# Patient Record
Sex: MALE | Race: WHITE | URBAN - METROPOLITAN AREA
[De-identification: names, ages, dates, MRNs, and addresses within clinical notes are randomized per-mention and may not be internally consistent; named-entity substitution may affect disease eponyms.]

---

## 2017-01-03 ENCOUNTER — OFFICE VISIT (OUTPATIENT)
Dept: DERMATOLOGY CLINIC | Facility: CLINIC | Age: 72
End: 2017-01-03

## 2017-01-03 DIAGNOSIS — L81.4 SOLAR LENTIGO: ICD-10-CM

## 2017-01-03 DIAGNOSIS — D23.4 BENIGN NEOPLASM OF SCALP AND SKIN OF NECK: ICD-10-CM

## 2017-01-03 DIAGNOSIS — L82.1 SEBORRHEIC KERATOSES: ICD-10-CM

## 2017-01-03 DIAGNOSIS — L57.0 ACTINIC KERATOSIS: Primary | ICD-10-CM

## 2017-01-03 DIAGNOSIS — D23.60 BENIGN NEOPLASM OF SKIN OF UPPER LIMB, INCLUDING SHOULDER, UNSPECIFIED LATERALITY: ICD-10-CM

## 2017-01-03 DIAGNOSIS — D23.5 BENIGN NEOPLASM OF SKIN OF TRUNK, EXCEPT SCROTUM: ICD-10-CM

## 2017-01-03 DIAGNOSIS — D23.30 BENIGN NEOPLASM OF SKIN OF FACE: ICD-10-CM

## 2017-01-03 PROCEDURE — 17000 DESTRUCT PREMALG LESION: CPT | Performed by: DERMATOLOGY

## 2017-01-03 PROCEDURE — 17003 DESTRUCT PREMALG LES 2-14: CPT | Performed by: DERMATOLOGY

## 2017-01-03 PROCEDURE — 99213 OFFICE O/P EST LOW 20 MIN: CPT | Performed by: DERMATOLOGY

## 2017-01-03 RX ORDER — ATORVASTATIN CALCIUM 20 MG/1
TABLET, FILM COATED ORAL
COMMUNITY
Start: 2016-10-10

## 2017-01-03 NOTE — PROGRESS NOTES
HPI:     Chief Complaint     Lesion        HPI     Lesion    Additional comments: Last visit to derm was 5 months prior. Pt presents with concern of multiple lesions on skin from waist to scalp and requests an upper body skin evalution.   Pt does have of file   Not on file  Other Topics Concern    Pt has a pacemaker No    Pt has a defibrillator No    Reaction to local anesthetic No     Social History Narrative     Family History   Problem Relation Age of Onset   • Cancer Father    • Heart Disorder Mother Placed This Encounter  DESTRUCTION PREMALIGNANT LESIONS, FIRST LES  DESTRUCTION PREMALIGNANT LESIONS, 2ND - 13    Results From Past 48 Hours:  No results found for this or any previous visit (from the past 50 hour(s)).     Meds This Visit:      Genevieve Soria

## 2017-01-03 NOTE — PROGRESS NOTES
Past Medical History   Diagnosis Date   • Actinic keratosis    • Hyperlipemia    • Hypertelorism    • Unspecified essential hypertension          Past Surgical History    KNEE ARTHROSCOPY         Social History   Marital Status:   Spouse Name: N/A

## 2017-07-06 ENCOUNTER — OFFICE VISIT (OUTPATIENT)
Dept: DERMATOLOGY CLINIC | Facility: CLINIC | Age: 72
End: 2017-07-06

## 2017-07-06 DIAGNOSIS — L57.8 SUN-DAMAGED SKIN: ICD-10-CM

## 2017-07-06 DIAGNOSIS — L81.4 SOLAR LENTIGO: ICD-10-CM

## 2017-07-06 DIAGNOSIS — L57.0 ACTINIC KERATOSIS: Primary | ICD-10-CM

## 2017-07-06 PROCEDURE — 17000 DESTRUCT PREMALG LESION: CPT | Performed by: DERMATOLOGY

## 2017-07-06 PROCEDURE — 17003 DESTRUCT PREMALG LES 2-14: CPT | Performed by: DERMATOLOGY

## 2017-07-06 PROCEDURE — 99212 OFFICE O/P EST SF 10 MIN: CPT | Performed by: DERMATOLOGY

## 2017-07-06 NOTE — PROGRESS NOTES
HPI:     Chief Complaint     Derm Problem        HPI     Derm Problem    Additional comments: established pt. presents for 7 months follow up for AKs - cryotherapy done at last visit - pt wishes to re-eval forehead. \"cryotherapy helps a lot\".         Last History   Problem Relation Age of Onset   • Cancer Father    • Heart Disorder Mother    • Hypertension Mother    • Stroke Mother    • Other [OTHER] Mother    • Melanoma Other      malignant-unk relative    • Heart Disease Brother      brother has hx of CAB

## 2018-01-04 ENCOUNTER — OFFICE VISIT (OUTPATIENT)
Dept: DERMATOLOGY CLINIC | Facility: CLINIC | Age: 73
End: 2018-01-04

## 2018-01-04 DIAGNOSIS — L57.8 SUN-DAMAGED SKIN: ICD-10-CM

## 2018-01-04 DIAGNOSIS — L57.0 ACTINIC KERATOSIS: Primary | ICD-10-CM

## 2018-01-04 PROCEDURE — G0463 HOSPITAL OUTPT CLINIC VISIT: HCPCS | Performed by: DERMATOLOGY

## 2018-01-04 PROCEDURE — 99213 OFFICE O/P EST LOW 20 MIN: CPT | Performed by: DERMATOLOGY

## 2018-01-04 RX ORDER — SODIUM FLUORIDE 1.1 G/100G
GEL, DENTIFRICE ORAL
COMMUNITY
Start: 2017-11-30

## 2018-01-04 RX ORDER — FLUOROURACIL 50 MG/G
1 CREAM TOPICAL EVERY EVENING
Qty: 40 G | Refills: 2 | Status: SHIPPED | OUTPATIENT
Start: 2018-01-04 | End: 2018-02-01

## 2018-01-04 NOTE — PROGRESS NOTES
HPI:     Chief Complaint     Upper Body Exam        HPI     Upper Body Exam    Additional comments: LOV 7/6/2017. Pt presenting for 6 month upper body skin exam - Pt states forehead to be checked only.  Pt has a personal hx of AKs        Last edited by [employer] anesthetic No     Social History Narrative   None on file     Family History   Problem Relation Age of Onset   • Cancer Father    • Heart Disorder Mother    • Hypertension Mother    • Stroke Mother    • Other [OTHER] Mother    • Melanoma Other      maligna

## 2018-03-22 ENCOUNTER — OFFICE VISIT (OUTPATIENT)
Dept: DERMATOLOGY CLINIC | Facility: CLINIC | Age: 73
End: 2018-03-22

## 2018-03-22 DIAGNOSIS — L57.0 ACTINIC KERATOSIS: Primary | ICD-10-CM

## 2018-03-22 DIAGNOSIS — L57.8 SUN-DAMAGED SKIN: ICD-10-CM

## 2018-03-22 DIAGNOSIS — L81.4 SOLAR LENTIGO: ICD-10-CM

## 2018-03-22 PROCEDURE — 17000 DESTRUCT PREMALG LESION: CPT | Performed by: DERMATOLOGY

## 2018-03-22 PROCEDURE — 99212 OFFICE O/P EST SF 10 MIN: CPT | Performed by: DERMATOLOGY

## 2018-03-22 RX ORDER — GLIPIZIDE 2.5 MG/1
TABLET, EXTENDED RELEASE ORAL
COMMUNITY
Start: 2018-02-27

## 2018-03-22 NOTE — PROGRESS NOTES
HPI:     Chief Complaint     Actinic Keratosis        HPI     Actinic Keratosis    Additional comments: LOV: 01/04/18. Pt presents with AK f/u, pt using Efudex with imrpovement.         Last edited by Rafat Auguste, [id number] on 3/22/2018 11:03 AM. (History) pacemaker No    Pt has a defibrillator No    Reaction to local anesthetic No     Social History Narrative   None on file     Family History   Problem Relation Age of Onset   • Cancer Father    • Heart Disorder Mother    • Hypertension Mother    • Stroke Mo

## 2018-03-22 NOTE — PROGRESS NOTES
Past Medical History:   Diagnosis Date   • Actinic keratosis    • Hyperlipemia    • Hypertelorism    • Unspecified essential hypertension      Past Surgical History:  No date: KNEE ARTHROSCOPY    Social History  Social History   Marital status:   Sp

## 2018-09-27 ENCOUNTER — OFFICE VISIT (OUTPATIENT)
Dept: DERMATOLOGY CLINIC | Facility: CLINIC | Age: 73
End: 2018-09-27
Payer: MEDICARE

## 2018-09-27 DIAGNOSIS — L81.4 SOLAR LENTIGO: ICD-10-CM

## 2018-09-27 DIAGNOSIS — D23.60 BENIGN NEOPLASM OF SKIN OF UPPER LIMB, INCLUDING SHOULDER, UNSPECIFIED LATERALITY: ICD-10-CM

## 2018-09-27 DIAGNOSIS — L57.8 SUN-DAMAGED SKIN: ICD-10-CM

## 2018-09-27 DIAGNOSIS — D23.30 BENIGN NEOPLASM OF SKIN OF FACE: ICD-10-CM

## 2018-09-27 DIAGNOSIS — D23.4 BENIGN NEOPLASM OF SCALP AND SKIN OF NECK: ICD-10-CM

## 2018-09-27 DIAGNOSIS — D23.5 BENIGN NEOPLASM OF SKIN OF TRUNK, EXCEPT SCROTUM: ICD-10-CM

## 2018-09-27 DIAGNOSIS — L82.1 SEBORRHEIC KERATOSES: ICD-10-CM

## 2018-09-27 DIAGNOSIS — L57.0 ACTINIC KERATOSIS: Primary | ICD-10-CM

## 2018-09-27 PROCEDURE — 99213 OFFICE O/P EST LOW 20 MIN: CPT | Performed by: DERMATOLOGY

## 2018-09-27 PROCEDURE — 17003 DESTRUCT PREMALG LES 2-14: CPT | Performed by: DERMATOLOGY

## 2018-09-27 PROCEDURE — 17000 DESTRUCT PREMALG LESION: CPT | Performed by: DERMATOLOGY

## 2018-09-27 NOTE — PROGRESS NOTES
Past Medical History:   Diagnosis Date   • Actinic keratosis    • Hyperlipemia    • Hypertelorism    • Unspecified essential hypertension      Past Surgical History:  No date: KNEE ARTHROSCOPY  Social History    Socioeconomic History      Marital status: M

## 2018-09-27 NOTE — PROGRESS NOTES
HPI:     Chief Complaint     Lesion        HPI     Lesion      Additional comments: LOV: 03/22/18. Pt presents with lesions of concern from scalp to waist, pt requests an upper body exam. Pt has personal hx of AK.            Last edited by Jovani Morales CM on file    Social Needs      Financial resource strain: Not on file      Food insecurity - worry: Not on file      Food insecurity - inability: Not on file      Transportation needs - medical: Not on file      Transportation needs - non-medical: Not on netta diagnosis) lesions as noted on scalp and face are treated with cryotherapy. Patient will follow-up in 6 months.   If the left cheek lesion does not resolve may need a- biopsy to rule out less likely [facility]  Sun-damaged skin-proper use and application of broad

## 2019-04-23 ENCOUNTER — OFFICE VISIT (OUTPATIENT)
Dept: DERMATOLOGY CLINIC | Facility: CLINIC | Age: 74
End: 2019-04-23
Payer: MEDICARE

## 2019-04-23 DIAGNOSIS — D23.60 BENIGN NEOPLASM OF SKIN OF UPPER LIMB, INCLUDING SHOULDER, UNSPECIFIED LATERALITY: ICD-10-CM

## 2019-04-23 DIAGNOSIS — L81.4 SOLAR LENTIGO: ICD-10-CM

## 2019-04-23 DIAGNOSIS — D23.30 BENIGN NEOPLASM OF SKIN OF FACE: ICD-10-CM

## 2019-04-23 DIAGNOSIS — L57.0 ACTINIC KERATOSIS: Primary | ICD-10-CM

## 2019-04-23 DIAGNOSIS — D23.4 BENIGN NEOPLASM OF SCALP AND SKIN OF NECK: ICD-10-CM

## 2019-04-23 DIAGNOSIS — L57.8 SUN-DAMAGED SKIN: ICD-10-CM

## 2019-04-23 DIAGNOSIS — D23.5 BENIGN NEOPLASM OF SKIN OF TRUNK, EXCEPT SCROTUM: ICD-10-CM

## 2019-04-23 DIAGNOSIS — L82.1 SEBORRHEIC KERATOSES: ICD-10-CM

## 2019-04-23 PROCEDURE — 17003 DESTRUCT PREMALG LES 2-14: CPT | Performed by: DERMATOLOGY

## 2019-04-23 PROCEDURE — 17000 DESTRUCT PREMALG LESION: CPT | Performed by: DERMATOLOGY

## 2019-04-23 PROCEDURE — 99213 OFFICE O/P EST LOW 20 MIN: CPT | Performed by: DERMATOLOGY

## 2019-04-23 NOTE — PROGRESS NOTES
HPI:     Chief Complaint     Actinic Keratosis        HPI     Actinic Keratosis      Additional comments: LOV9/27/18. pt presenting today with f/u on actinic keratosis. no concerns. Denies family and personal HX of skin cancer.           Last edited by Fabricio Anguiano Socioeconomic History      Marital status:       Spouse name: Not on file      Number of children: Not on file      Years of education: Not on file      Highest education level: Not on file    Occupational History      Not on file    Social Needs performed, including face, neck, chest, back, abdomen, r upper extremity, l upper extremity. Also exam of scalp    The patient is alert, oriented and appears their stated age. The patient is in no distress and is well nourished.     Exam is remarkable for t

## 2019-10-29 ENCOUNTER — OFFICE VISIT (OUTPATIENT)
Dept: DERMATOLOGY CLINIC | Facility: CLINIC | Age: 74
End: 2019-10-29
Payer: MEDICARE

## 2019-10-29 DIAGNOSIS — L81.4 SOLAR LENTIGO: ICD-10-CM

## 2019-10-29 DIAGNOSIS — L82.1 SEBORRHEIC KERATOSES: ICD-10-CM

## 2019-10-29 DIAGNOSIS — L57.0 ACTINIC KERATOSIS: Primary | ICD-10-CM

## 2019-10-29 DIAGNOSIS — D23.4 BENIGN NEOPLASM OF SCALP AND SKIN OF NECK: ICD-10-CM

## 2019-10-29 DIAGNOSIS — D23.60 BENIGN NEOPLASM OF SKIN OF UPPER LIMB, INCLUDING SHOULDER, UNSPECIFIED LATERALITY: ICD-10-CM

## 2019-10-29 DIAGNOSIS — D23.5 BENIGN NEOPLASM OF SKIN OF TRUNK, EXCEPT SCROTUM: ICD-10-CM

## 2019-10-29 DIAGNOSIS — D23.30 BENIGN NEOPLASM OF SKIN OF FACE: ICD-10-CM

## 2019-10-29 PROCEDURE — 17003 DESTRUCT PREMALG LES 2-14: CPT | Performed by: DERMATOLOGY

## 2019-10-29 PROCEDURE — 17000 DESTRUCT PREMALG LESION: CPT | Performed by: DERMATOLOGY

## 2019-10-29 PROCEDURE — 99213 OFFICE O/P EST LOW 20 MIN: CPT | Performed by: DERMATOLOGY

## 2019-10-29 PROCEDURE — G0463 HOSPITAL OUTPT CLINIC VISIT: HCPCS | Performed by: DERMATOLOGY

## 2019-10-29 NOTE — PROGRESS NOTES
HPI:     Chief Complaint     Upper Body Exam        HPI     Upper Body Exam      Additional comments: LOV 4/23/2019 Patient present for upper body skin exam . Patient has hx of AK          Last edited by Carloz Dunn on 10/29/2019  7:54 AM. (Histor Not on file      Number of children: Not on file      Years of education: Not on file      Highest education level: Not on file    Occupational History      Not on file    Social Needs      Financial resource strain: Not on file      Food insecurity: extremity, l upper extremity. Also exam of scalp  The patient is alert, oriented and appears their stated age. The patient is in no distress and is well nourished.     Exam is remarkable for the following:    - melanocytic nevi are uniform in color, shape defined types were placed in this encounter.         10/29/2019  Tamea Courser

## 2020-06-16 ENCOUNTER — OFFICE VISIT (OUTPATIENT)
Dept: DERMATOLOGY CLINIC | Facility: CLINIC | Age: 75
End: 2020-06-16
Payer: MEDICARE

## 2020-06-16 DIAGNOSIS — D23.60 BENIGN NEOPLASM OF SKIN OF UPPER LIMB, INCLUDING SHOULDER, UNSPECIFIED LATERALITY: ICD-10-CM

## 2020-06-16 DIAGNOSIS — D23.5 BENIGN NEOPLASM OF SKIN OF TRUNK, EXCEPT SCROTUM: ICD-10-CM

## 2020-06-16 DIAGNOSIS — D23.4 BENIGN NEOPLASM OF SCALP AND SKIN OF NECK: ICD-10-CM

## 2020-06-16 DIAGNOSIS — D23.30 BENIGN NEOPLASM OF SKIN OF FACE: ICD-10-CM

## 2020-06-16 DIAGNOSIS — L57.8 SUN-DAMAGED SKIN: ICD-10-CM

## 2020-06-16 DIAGNOSIS — L81.4 SOLAR LENTIGO: ICD-10-CM

## 2020-06-16 DIAGNOSIS — L82.1 SEBORRHEIC KERATOSES: ICD-10-CM

## 2020-06-16 DIAGNOSIS — L57.0 ACTINIC KERATOSIS: Primary | ICD-10-CM

## 2020-06-16 PROCEDURE — 17000 DESTRUCT PREMALG LESION: CPT | Performed by: DERMATOLOGY

## 2020-06-16 PROCEDURE — G0463 HOSPITAL OUTPT CLINIC VISIT: HCPCS | Performed by: DERMATOLOGY

## 2020-06-16 PROCEDURE — 17003 DESTRUCT PREMALG LES 2-14: CPT | Performed by: DERMATOLOGY

## 2020-06-16 PROCEDURE — 99213 OFFICE O/P EST LOW 20 MIN: CPT | Performed by: DERMATOLOGY

## 2020-06-16 RX ORDER — LEVOFLOXACIN 750 MG/1
TABLET ORAL
COMMUNITY
Start: 2019-11-19 | End: 2021-12-27

## 2020-06-16 RX ORDER — TAMSULOSIN HYDROCHLORIDE 0.4 MG/1
0.4 CAPSULE ORAL DAILY
COMMUNITY
Start: 2020-02-12

## 2020-06-16 RX ORDER — BLOOD SUGAR DIAGNOSTIC
STRIP MISCELLANEOUS
COMMUNITY
Start: 2020-06-09

## 2020-12-14 ENCOUNTER — OFFICE VISIT (OUTPATIENT)
Dept: DERMATOLOGY CLINIC | Facility: CLINIC | Age: 75
End: 2020-12-14
Payer: MEDICARE

## 2020-12-14 DIAGNOSIS — D23.5 BENIGN NEOPLASM OF SKIN OF TRUNK, EXCEPT SCROTUM: ICD-10-CM

## 2020-12-14 DIAGNOSIS — L57.0 ACTINIC KERATOSIS: Primary | ICD-10-CM

## 2020-12-14 DIAGNOSIS — L57.8 SUN-DAMAGED SKIN: ICD-10-CM

## 2020-12-14 DIAGNOSIS — D23.60 BENIGN NEOPLASM OF SKIN OF UPPER LIMB, INCLUDING SHOULDER, UNSPECIFIED LATERALITY: ICD-10-CM

## 2020-12-14 DIAGNOSIS — L81.4 SOLAR LENTIGO: ICD-10-CM

## 2020-12-14 DIAGNOSIS — L82.1 SEBORRHEIC KERATOSES: ICD-10-CM

## 2020-12-14 DIAGNOSIS — D23.30 BENIGN NEOPLASM OF SKIN OF FACE: ICD-10-CM

## 2020-12-14 DIAGNOSIS — D23.4 BENIGN NEOPLASM OF SCALP AND SKIN OF NECK: ICD-10-CM

## 2020-12-14 PROCEDURE — 17000 DESTRUCT PREMALG LESION: CPT | Performed by: DERMATOLOGY

## 2020-12-14 PROCEDURE — G0463 HOSPITAL OUTPT CLINIC VISIT: HCPCS | Performed by: DERMATOLOGY

## 2020-12-14 PROCEDURE — 17003 DESTRUCT PREMALG LES 2-14: CPT | Performed by: DERMATOLOGY

## 2020-12-14 PROCEDURE — 99213 OFFICE O/P EST LOW 20 MIN: CPT | Performed by: DERMATOLOGY

## 2020-12-14 NOTE — PROGRESS NOTES
HPI:     Chief Complaint     Derm Problem        HPI     Derm Problem      Additional comments: LOV 6/16/2020 - Pt presenting for 6 month follow up to reassess AK's on upper body. Pt has personal hx of AK's but no hx of skin cancer.           Last edited • Hypertelorism    • Unspecified essential hypertension      Past Surgical History:   Procedure Laterality Date   • KNEE ARTHROSCOPY       Social History    Socioeconomic History      Marital status:       Spouse name: Not on file      Number of c • Melanoma Other         malignant-unk relative    • Heart Disease Brother         brother has hx of CABG       PHYSICAL EXAM:   An upper body exam was performed, including face, neck, chest, back, abdomen, r upper extremity, l upper extremity, and scalp Past 48 Hours:  No results found for this or any previous visit (from the past 48 hour(s)). Meds This Visit:      Imaging Orders:  None     Referral Orders:  No orders of the defined types were placed in this encounter.         12/14/2020  Erickson Kaplan

## 2021-06-21 ENCOUNTER — OFFICE VISIT (OUTPATIENT)
Dept: DERMATOLOGY CLINIC | Facility: CLINIC | Age: 76
End: 2021-06-21
Payer: COMMERCIAL

## 2021-06-21 DIAGNOSIS — L57.0 ACTINIC KERATOSIS: Primary | ICD-10-CM

## 2021-06-21 DIAGNOSIS — D23.5 BENIGN NEOPLASM OF SKIN OF TRUNK, EXCEPT SCROTUM: ICD-10-CM

## 2021-06-21 DIAGNOSIS — L81.4 SOLAR LENTIGO: ICD-10-CM

## 2021-06-21 DIAGNOSIS — L82.1 SEBORRHEIC KERATOSES: ICD-10-CM

## 2021-06-21 DIAGNOSIS — D23.30 BENIGN NEOPLASM OF SKIN OF FACE: ICD-10-CM

## 2021-06-21 DIAGNOSIS — D23.4 BENIGN NEOPLASM OF SCALP AND SKIN OF NECK: ICD-10-CM

## 2021-06-21 DIAGNOSIS — L57.8 SUN-DAMAGED SKIN: ICD-10-CM

## 2021-06-21 DIAGNOSIS — D23.60 BENIGN NEOPLASM OF SKIN OF UPPER LIMB, INCLUDING SHOULDER, UNSPECIFIED LATERALITY: ICD-10-CM

## 2021-06-21 PROCEDURE — 17003 DESTRUCT PREMALG LES 2-14: CPT | Performed by: DERMATOLOGY

## 2021-06-21 PROCEDURE — 99213 OFFICE O/P EST LOW 20 MIN: CPT | Performed by: DERMATOLOGY

## 2021-06-21 PROCEDURE — 17000 DESTRUCT PREMALG LESION: CPT | Performed by: DERMATOLOGY

## 2021-06-21 RX ORDER — LISINOPRIL AND HYDROCHLOROTHIAZIDE 20; 12.5 MG/1; MG/1
1 TABLET ORAL
COMMUNITY
Start: 2021-03-10

## 2021-06-21 NOTE — PROGRESS NOTES
HPI:     Chief Complaint     Upper Body Exam        HPI     Upper Body Exam      Additional comments: last office visit: 12/14/20 . Patient present for follow up of AK.   History of AK          Last edited by Andreea Carbajal RN on 6/21/2021  8:32 AM. (Histor UNKNOWN    Past Medical History:   Diagnosis Date   • Actinic keratosis    • Hyperlipemia    • Hypertelorism    • Unspecified essential hypertension      Past Surgical History:   Procedure Laterality Date   • KNEE ARTHROSCOPY       Social History    Socioe Father    • Heart Disorder Mother    • Hypertension Mother    • Stroke Mother    • Other (Other) Mother    • Melanoma Other         malignant-unk relative    • Heart Disease Brother         brother has hx of CABG       PHYSICAL EXAM:   An upper body exam w upper limb, including shoulder, unspecified laterality    Orders Placed This Encounter      DESTRUCTION PREMALIGNANT LESIONS, FIRST LES      DESTRUCTION PREMALIGNANT LESIONS, 2ND - 14      Results From Past 48 Hours:  No results found for this or any previ

## 2021-10-04 ENCOUNTER — PATIENT MESSAGE (OUTPATIENT)
Dept: FAMILY MEDICINE CLINIC | Facility: CLINIC | Age: 76
End: 2021-10-04

## 2021-10-05 ENCOUNTER — OFFICE VISIT (OUTPATIENT)
Dept: OTOLARYNGOLOGY | Facility: CLINIC | Age: 76
End: 2021-10-05
Payer: COMMERCIAL

## 2021-10-05 VITALS — HEIGHT: 66.5 IN | WEIGHT: 157 LBS | BODY MASS INDEX: 24.93 KG/M2

## 2021-10-05 DIAGNOSIS — R42 DYSEQUILIBRIUM: ICD-10-CM

## 2021-10-05 DIAGNOSIS — Z87.898 HISTORY OF VERTIGO: Primary | ICD-10-CM

## 2021-10-05 PROCEDURE — 3008F BODY MASS INDEX DOCD: CPT | Performed by: SPECIALIST

## 2021-10-05 PROCEDURE — 99204 OFFICE O/P NEW MOD 45 MIN: CPT | Performed by: SPECIALIST

## 2021-10-05 NOTE — PROGRESS NOTES
Diogo Lynne is a 67year old male. Patient presents with:  Dizziness: pt presents today for vertigo. pt was in the er at [facility] on 09/26 due to  lightheadness. HPI:   With 3 bouts of lightheadedness and nausea as well as disequilibrium. denies headaches    EXAM:   Ht 5' 6.5\" (1.689 m)   Wt 157 lb (71.2 kg)   BMI 24.96 kg/m²   System Details   Skin Inspection - Normal.   Constitutional Overall appearance - Normal.   Head/Face Facial features - Normal. Eyebrows - Normal. Skull - Normal. AM

## 2021-10-05 NOTE — PATIENT INSTRUCTIONS
You have had multiple bouts of disequilibrium. Your hearing seems grossly intact. I do think you will need both an audiogram and very likely an electronystagmogram to better assess the function of your inner ear.   It is possible, that you also may need a

## 2021-11-09 ENCOUNTER — OFFICE VISIT (OUTPATIENT)
Dept: AUDIOLOGY | Facility: CLINIC | Age: 76
End: 2021-11-09
Payer: COMMERCIAL

## 2021-11-09 DIAGNOSIS — R42 DIZZINESS: Primary | ICD-10-CM

## 2021-11-09 PROCEDURE — 92567 TYMPANOMETRY: CPT | Performed by: AUDIOLOGIST

## 2021-11-09 PROCEDURE — 92557 COMPREHENSIVE HEARING TEST: CPT | Performed by: AUDIOLOGIST

## 2021-12-27 ENCOUNTER — OFFICE VISIT (OUTPATIENT)
Dept: DERMATOLOGY CLINIC | Facility: CLINIC | Age: 76
End: 2021-12-27
Payer: COMMERCIAL

## 2021-12-27 DIAGNOSIS — L57.8 SUN-DAMAGED SKIN: ICD-10-CM

## 2021-12-27 DIAGNOSIS — L57.0 ACTINIC KERATOSIS: Primary | ICD-10-CM

## 2021-12-27 DIAGNOSIS — L81.4 SOLAR LENTIGO: ICD-10-CM

## 2021-12-27 DIAGNOSIS — L82.1 SEBORRHEIC KERATOSES: ICD-10-CM

## 2021-12-27 DIAGNOSIS — D22.9 MULTIPLE MELANOCYTIC NEVI: ICD-10-CM

## 2021-12-27 PROCEDURE — 17003 DESTRUCT PREMALG LES 2-14: CPT | Performed by: DERMATOLOGY

## 2021-12-27 PROCEDURE — 17000 DESTRUCT PREMALG LESION: CPT | Performed by: DERMATOLOGY

## 2021-12-27 PROCEDURE — 99213 OFFICE O/P EST LOW 20 MIN: CPT | Performed by: DERMATOLOGY

## 2021-12-27 NOTE — PROGRESS NOTES
HPI:     Chief Complaint     Derm Problem        HPI     Derm Problem      Additional comments: LOV 6/21/21 - Pt presenting for assessment of lesions on upper body in light of pt's personal hx of AKs.           Last edited by Riky Pickett RN on 12/27/2 Hypertelorism    • Unspecified essential hypertension      Past Surgical History:   Procedure Laterality Date   • KNEE ARTHROSCOPY       Social History    Socioeconomic History      Marital status:       Spouse name: Not on file      Number of child posterior crown of the scalp, 1 at the mid frontal hairline, one at the lateral left forehead, 1 by the right frontal hairline, and 1 on the left helical rim  - there are some cherry red papule  - there are numerous brown stuck on keratotic lesions.

## 2022-06-24 ENCOUNTER — OFFICE VISIT (OUTPATIENT)
Dept: DERMATOLOGY CLINIC | Facility: CLINIC | Age: 77
End: 2022-06-24
Payer: COMMERCIAL

## 2022-06-24 DIAGNOSIS — D23.60 BENIGN NEOPLASM OF SKIN OF UPPER LIMB, INCLUDING SHOULDER, UNSPECIFIED LATERALITY: ICD-10-CM

## 2022-06-24 DIAGNOSIS — D22.9 MULTIPLE MELANOCYTIC NEVI: ICD-10-CM

## 2022-06-24 DIAGNOSIS — L57.0 ACTINIC KERATOSIS: Primary | ICD-10-CM

## 2022-06-24 DIAGNOSIS — L82.1 SEBORRHEIC KERATOSES: ICD-10-CM

## 2022-06-24 DIAGNOSIS — D23.4 BENIGN NEOPLASM OF SCALP AND SKIN OF NECK: ICD-10-CM

## 2022-06-24 DIAGNOSIS — L81.4 SOLAR LENTIGO: ICD-10-CM

## 2022-06-24 DIAGNOSIS — D23.30 BENIGN NEOPLASM OF SKIN OF FACE: ICD-10-CM

## 2022-06-24 DIAGNOSIS — D23.5 BENIGN NEOPLASM OF SKIN OF TRUNK, EXCEPT SCROTUM: ICD-10-CM

## 2022-06-24 RX ORDER — COVID-19 MOLECULAR TEST ASSAY
KIT MISCELLANEOUS
COMMUNITY
Start: 2022-04-08

## 2022-08-31 ENCOUNTER — OFFICE VISIT (OUTPATIENT)
Dept: DERMATOLOGY CLINIC | Facility: CLINIC | Age: 77
End: 2022-08-31
Payer: COMMERCIAL

## 2022-08-31 DIAGNOSIS — D23.9 BENIGN NEOPLASM OF SKIN, UNSPECIFIED LOCATION: ICD-10-CM

## 2022-08-31 DIAGNOSIS — L57.8 SUN-DAMAGED SKIN: ICD-10-CM

## 2022-08-31 DIAGNOSIS — L81.4 SOLAR LENTIGO: ICD-10-CM

## 2022-08-31 DIAGNOSIS — D48.5 NEOPLASM OF UNCERTAIN BEHAVIOR OF SKIN: Primary | ICD-10-CM

## 2022-08-31 DIAGNOSIS — L82.1 SEBORRHEIC KERATOSES: ICD-10-CM

## 2022-08-31 DIAGNOSIS — D22.9 MULTIPLE MELANOCYTIC NEVI: ICD-10-CM

## 2022-08-31 DIAGNOSIS — L57.0 ACTINIC KERATOSIS: ICD-10-CM

## 2022-08-31 PROCEDURE — 88305 TISSUE EXAM BY PATHOLOGIST: CPT | Performed by: DERMATOLOGY

## 2022-09-09 NOTE — PROGRESS NOTES
Operative Report                     Shave/  Tangential biopsy     Clinical diagnosis:    Size of lesion:    Location:pt with tender lesions persistent post cryo  Spec 1 Description >>>>>: right vertex  Spec 1 Comment: r/o SCC keratotic papule 6mm  Spec 2 Description >>>>>: right helical rim  Spec 2 Comment: 1cm keratotic papule r/o SCC tender lesion    Procedure: With patient in appropriate position the skin of the above was scrubbed with alcohol. Anesthesia was obtained with 1% Xylocaine with epinephrine. The skin surrounding the lesion was placed under tension and the lesion was incised using a #15 scalpel blade. The specimen was sent for histopathologic exam.    Hemostasis was obtained with electrocautery/aluminum chloride. Estimated blood loss less than 2 cc. Biopsy dressed with Polysporin, bandage. Pressure dressing:   No    Complications: None    Written instructions given and reviewed with patient    Await pathology    Contact information reviewed.     Procedural physician:  Nelida Michelle MD

## 2022-09-09 NOTE — PROGRESS NOTES
Logged in path book and pmh. Pt informed of pathology results and KMT's recommendations for treatment. Pt verbalized understanding and provided with contact information for Dr. Nora Short. Pt's pathology results routed to Dr. Nora Short.

## 2022-09-09 NOTE — PROGRESS NOTES
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 With this location would suggest excision /reconstruction with Dr. Gianfranco Hargrove . please log in test results. Please call patient and inform of results and recommendations.  (please add to history). Pt to  rtc as scheduled or prn.

## 2022-09-16 ENCOUNTER — TELEPHONE (OUTPATIENT)
Dept: OTOLARYNGOLOGY | Facility: CLINIC | Age: 77
End: 2022-09-16

## 2022-09-16 ENCOUNTER — OFFICE VISIT (OUTPATIENT)
Dept: OTOLARYNGOLOGY | Facility: CLINIC | Age: 77
End: 2022-09-16
Payer: COMMERCIAL

## 2022-09-16 VITALS — WEIGHT: 159 LBS | HEIGHT: 66.5 IN | BODY MASS INDEX: 25.25 KG/M2

## 2022-09-16 DIAGNOSIS — C44.202 CANCER OF SKIN OF AURICLE OF RIGHT EAR: Primary | ICD-10-CM

## 2022-09-16 PROCEDURE — 1126F AMNT PAIN NOTED NONE PRSNT: CPT | Performed by: OTOLARYNGOLOGY

## 2022-09-16 PROCEDURE — 3008F BODY MASS INDEX DOCD: CPT | Performed by: OTOLARYNGOLOGY

## 2022-09-16 PROCEDURE — 99213 OFFICE O/P EST LOW 20 MIN: CPT | Performed by: OTOLARYNGOLOGY

## 2022-09-20 ENCOUNTER — TELEPHONE (OUTPATIENT)
Dept: OTOLARYNGOLOGY | Facility: CLINIC | Age: 77
End: 2022-09-20

## 2022-09-20 NOTE — TELEPHONE ENCOUNTER
Patient states he was told he was having a procedure tomorrow 09/21 and when he called to register they have no record of it.  Please advise

## 2022-09-21 ENCOUNTER — LAB REQUISITION (OUTPATIENT)
Dept: LAB | Facility: HOSPITAL | Age: 77
End: 2022-09-21

## 2022-09-21 DIAGNOSIS — C44.221 SQUAMOUS CELL CARCINOMA OF SKIN OF UNSPECIFIED EAR AND EXTERNAL AURICULAR CANAL: ICD-10-CM

## 2022-09-21 PROCEDURE — 88331 PATH CONSLTJ SURG 1 BLK 1SPC: CPT | Performed by: OTOLARYNGOLOGY

## 2022-09-21 PROCEDURE — 88305 TISSUE EXAM BY PATHOLOGIST: CPT | Performed by: OTOLARYNGOLOGY

## 2022-09-21 RX ORDER — HYDROCODONE BITARTRATE AND ACETAMINOPHEN 5; 325 MG/1; MG/1
1 TABLET ORAL EVERY 8 HOURS PRN
Qty: 10 TABLET | Refills: 0 | Status: SHIPPED | OUTPATIENT
Start: 2022-09-21

## 2022-09-21 RX ORDER — SULFAMETHOXAZOLE AND TRIMETHOPRIM 800; 160 MG/1; MG/1
1 TABLET ORAL EVERY 12 HOURS
Qty: 14 TABLET | Refills: 0 | Status: SHIPPED | OUTPATIENT
Start: 2022-09-21

## 2022-09-22 ENCOUNTER — TELEPHONE (OUTPATIENT)
Dept: OTOLARYNGOLOGY | Facility: CLINIC | Age: 77
End: 2022-09-22

## 2022-09-22 RX ORDER — CLINDAMYCIN HYDROCHLORIDE 150 MG/1
300 CAPSULE ORAL 3 TIMES DAILY
Qty: 42 CAPSULE | Refills: 0 | Status: SHIPPED | OUTPATIENT
Start: 2022-09-22 | End: 2022-09-29

## 2022-09-22 NOTE — TELEPHONE ENCOUNTER
Spoke with patient and spouse. He is doing well. Minimal pain 2/10. Using tylenol. Started ABX. Will use abx ointment as recommended by Dr. Donna Jarrett. No fever or other signs of infection. They will call us if this changes. Patient spouse mentioned that an interaction came up that   sulfamethoxazole-trimethoprim -160 MG Oral Tab per tablet    And his     Lisinopril-hydroCHLOROthiazide 20-12.5 MG Oral Tab    Title Angiotensin-Converting Enzyme Inhibitors / Trimethoprim    Risk Rating C: Monitor therapy    Summary Trimethoprim may enhance the hyperkalemic effect of Angiotensin-Converting Enzyme Inhibitors. Severity Moderate Reliability Rating Good    Dr. Donna Jarrett informed. Would like switched to clindamycin 300mg TID. Ordered. patient informed.

## 2022-09-29 ENCOUNTER — OFFICE VISIT (OUTPATIENT)
Dept: OTOLARYNGOLOGY | Facility: CLINIC | Age: 77
End: 2022-09-29

## 2022-09-29 DIAGNOSIS — C44.202 CANCER OF SKIN OF AURICLE OF RIGHT EAR: Primary | ICD-10-CM

## 2022-09-29 PROCEDURE — 99024 POSTOP FOLLOW-UP VISIT: CPT | Performed by: OTOLARYNGOLOGY

## 2022-11-08 ENCOUNTER — LAB ENCOUNTER (OUTPATIENT)
Dept: LAB | Facility: HOSPITAL | Age: 77
End: 2022-11-08
Attending: INTERNAL MEDICINE
Payer: MEDICARE

## 2022-11-08 DIAGNOSIS — I10 HTN (HYPERTENSION): Primary | ICD-10-CM

## 2022-11-08 DIAGNOSIS — R97.20 PSA ELEVATION: ICD-10-CM

## 2022-11-08 DIAGNOSIS — E11.9 DIABETES MELLITUS (HCC): ICD-10-CM

## 2022-11-08 LAB
ALBUMIN SERPL-MCNC: 3.3 G/DL (ref 3.4–5)
ALBUMIN/GLOB SERPL: 0.8 {RATIO} (ref 1–2)
ALP LIVER SERPL-CCNC: 95 U/L
ALT SERPL-CCNC: 25 U/L
ANION GAP SERPL CALC-SCNC: 6 MMOL/L (ref 0–18)
AST SERPL-CCNC: 16 U/L (ref 15–37)
BASOPHILS # BLD AUTO: 0.04 X10(3) UL (ref 0–0.2)
BASOPHILS NFR BLD AUTO: 0.6 %
BILIRUB SERPL-MCNC: 0.7 MG/DL (ref 0.1–2)
BUN BLD-MCNC: 21 MG/DL (ref 7–18)
BUN/CREAT SERPL: 18.1 (ref 10–20)
CALCIUM BLD-MCNC: 8.9 MG/DL (ref 8.5–10.1)
CHLORIDE SERPL-SCNC: 103 MMOL/L (ref 98–112)
CHOLEST SERPL-MCNC: 144 MG/DL (ref ?–200)
CO2 SERPL-SCNC: 27 MMOL/L (ref 21–32)
COMPLEXED PSA SERPL-MCNC: 5.7 NG/ML (ref ?–4)
CREAT BLD-MCNC: 1.16 MG/DL
DEPRECATED RDW RBC AUTO: 46.3 FL (ref 35.1–46.3)
EOSINOPHIL # BLD AUTO: 0.26 X10(3) UL (ref 0–0.7)
EOSINOPHIL NFR BLD AUTO: 4.1 %
ERYTHROCYTE [DISTWIDTH] IN BLOOD BY AUTOMATED COUNT: 12.9 % (ref 11–15)
EST. AVERAGE GLUCOSE BLD GHB EST-MCNC: 143 MG/DL (ref 68–126)
FASTING PATIENT LIPID ANSWER: YES
FASTING STATUS PATIENT QL REPORTED: YES
GFR SERPLBLD BASED ON 1.73 SQ M-ARVRAT: 64 ML/MIN/1.73M2 (ref 60–?)
GLOBULIN PLAS-MCNC: 4 G/DL (ref 2.8–4.4)
GLUCOSE BLD-MCNC: 165 MG/DL (ref 70–99)
HBA1C MFR BLD: 6.6 % (ref ?–5.7)
HCT VFR BLD AUTO: 41.5 %
HDLC SERPL-MCNC: 62 MG/DL (ref 40–59)
HGB BLD-MCNC: 13.5 G/DL
IMM GRANULOCYTES # BLD AUTO: 0.02 X10(3) UL (ref 0–1)
IMM GRANULOCYTES NFR BLD: 0.3 %
LDLC SERPL CALC-MCNC: 65 MG/DL (ref ?–100)
LYMPHOCYTES # BLD AUTO: 1.8 X10(3) UL (ref 1–4)
LYMPHOCYTES NFR BLD AUTO: 28.5 %
MCH RBC QN AUTO: 31.6 PG (ref 26–34)
MCHC RBC AUTO-ENTMCNC: 32.5 G/DL (ref 31–37)
MCV RBC AUTO: 97.2 FL
MONOCYTES # BLD AUTO: 0.53 X10(3) UL (ref 0.1–1)
MONOCYTES NFR BLD AUTO: 8.4 %
NEUTROPHILS # BLD AUTO: 3.67 X10 (3) UL (ref 1.5–7.7)
NEUTROPHILS # BLD AUTO: 3.67 X10(3) UL (ref 1.5–7.7)
NEUTROPHILS NFR BLD AUTO: 58.1 %
NONHDLC SERPL-MCNC: 82 MG/DL (ref ?–130)
OSMOLALITY SERPL CALC.SUM OF ELEC: 289 MOSM/KG (ref 275–295)
PLATELET # BLD AUTO: 236 10(3)UL (ref 150–450)
POTASSIUM SERPL-SCNC: 3.9 MMOL/L (ref 3.5–5.1)
PROT SERPL-MCNC: 7.3 G/DL (ref 6.4–8.2)
RBC # BLD AUTO: 4.27 X10(6)UL
SODIUM SERPL-SCNC: 136 MMOL/L (ref 136–145)
TRIGL SERPL-MCNC: 89 MG/DL (ref 30–149)
TSI SER-ACNC: 2.47 MIU/ML (ref 0.36–3.74)
VIT B12 SERPL-MCNC: 399 PG/ML (ref 193–986)
VLDLC SERPL CALC-MCNC: 13 MG/DL (ref 0–30)
WBC # BLD AUTO: 6.3 X10(3) UL (ref 4–11)

## 2022-11-08 PROCEDURE — 83036 HEMOGLOBIN GLYCOSYLATED A1C: CPT

## 2022-11-08 PROCEDURE — 80053 COMPREHEN METABOLIC PANEL: CPT

## 2022-11-08 PROCEDURE — 84443 ASSAY THYROID STIM HORMONE: CPT

## 2022-11-08 PROCEDURE — 36415 COLL VENOUS BLD VENIPUNCTURE: CPT

## 2022-11-08 PROCEDURE — 85025 COMPLETE CBC W/AUTO DIFF WBC: CPT

## 2022-11-08 PROCEDURE — 80061 LIPID PANEL: CPT

## 2022-11-08 PROCEDURE — 82607 VITAMIN B-12: CPT

## 2022-12-23 ENCOUNTER — OFFICE VISIT (OUTPATIENT)
Dept: DERMATOLOGY CLINIC | Facility: CLINIC | Age: 77
End: 2022-12-23
Payer: COMMERCIAL

## 2022-12-23 DIAGNOSIS — Z85.828 HISTORY OF NONMELANOMA SKIN CANCER: ICD-10-CM

## 2022-12-23 DIAGNOSIS — D48.5 NEOPLASM OF UNCERTAIN BEHAVIOR OF SKIN: ICD-10-CM

## 2022-12-23 DIAGNOSIS — L57.0 ACTINIC KERATOSIS: Primary | ICD-10-CM

## 2022-12-23 DIAGNOSIS — L82.1 SEBORRHEIC KERATOSES: ICD-10-CM

## 2022-12-23 DIAGNOSIS — D23.9 BENIGN NEOPLASM OF SKIN, UNSPECIFIED LOCATION: ICD-10-CM

## 2022-12-23 DIAGNOSIS — D22.9 MULTIPLE MELANOCYTIC NEVI: ICD-10-CM

## 2022-12-23 DIAGNOSIS — L81.4 SOLAR LENTIGO: ICD-10-CM

## 2022-12-23 PROCEDURE — 99213 OFFICE O/P EST LOW 20 MIN: CPT | Performed by: DERMATOLOGY

## 2022-12-23 PROCEDURE — 17003 DESTRUCT PREMALG LES 2-14: CPT | Performed by: DERMATOLOGY

## 2022-12-23 PROCEDURE — 1126F AMNT PAIN NOTED NONE PRSNT: CPT | Performed by: DERMATOLOGY

## 2022-12-23 PROCEDURE — 17000 DESTRUCT PREMALG LESION: CPT | Performed by: DERMATOLOGY

## 2022-12-23 RX ORDER — CARVEDILOL 12.5 MG/1
12.5 TABLET ORAL
COMMUNITY
Start: 2022-11-03

## 2022-12-23 RX ORDER — SODIUM FLUORIDE 6 MG/ML
PASTE, DENTIFRICE DENTAL
COMMUNITY
Start: 2022-11-10

## 2023-02-02 ENCOUNTER — OFFICE VISIT (OUTPATIENT)
Dept: OTOLARYNGOLOGY | Facility: CLINIC | Age: 78
End: 2023-02-02

## 2023-02-02 DIAGNOSIS — C44.202 CANCER OF SKIN OF AURICLE OF RIGHT EAR: Primary | ICD-10-CM

## 2023-02-02 PROCEDURE — 99213 OFFICE O/P EST LOW 20 MIN: CPT | Performed by: OTOLARYNGOLOGY

## 2023-04-27 DIAGNOSIS — E11.00 DIABETES MELLITUS TYPE 2 WITH HYPEROSMOLARITY, UNCONTROLLED (HCC): Primary | ICD-10-CM

## 2023-04-27 DIAGNOSIS — E11.65 DIABETES MELLITUS TYPE 2 WITH HYPEROSMOLARITY, UNCONTROLLED (HCC): Primary | ICD-10-CM

## 2023-04-28 ENCOUNTER — HOSPITAL ENCOUNTER (OUTPATIENT)
Dept: ENDOCRINOLOGY | Facility: HOSPITAL | Age: 78
Discharge: HOME | End: 2023-04-28
Attending: INTERNAL MEDICINE
Payer: MEDICARE

## 2023-04-28 VITALS — WEIGHT: 162.38 LBS | BODY MASS INDEX: 26 KG/M2

## 2023-04-28 NOTE — PROGRESS NOTES
Medical Nutrition Therapy Assessment    Rony Alves 1945 was seen for individual Diabetic Medical Nutrition Therapy:  Initial/individual    Date: 2023   Start time 1415  End time: 1      Assessment  Pt with h/o type 2 diabetes for a few years. His wife helps him follow the diabetes diet guidelines and requested this visit to ask nutrition questions and to be sure they are on the right track. Anthropometrics: There were no vitals taken for this visit. Current diabetes medications:  Glyburide in am  Metformin bid      Labs:  Lab Results   Component Value Date    A1C 6.6 (H) 2022    CHOLEST 144 2022    LDL 65 2022    HDL 62 (H) 2022    NONHDLC 82 2022    TRIG 89 2022    BUN 21 (H) 2022    CREATSERUM 1.16 2022       SMBG at home: yes  Frequency of testing 2 times per day  BG results: BG log   FB, 142, 156, 129 mg/dl  Pre Meal: 97, 108 mg/dl      Bedtime: 77, 112      Diet Hx:  (a.m.) 1-2 slices toast or an English muffin with Smart Balance, maybe 2 eggs on occasion, black coffee  (mid-day) very light lunch- 1 package of oatmeal or a small apple  (p.m.) 4oz beef or chicken or meatloaf, 1 small potato, 1/2 cp creamed corn, sometimes a salad or vegetables but not always   (snacks) usually popcorn or 1/4 cp low sugar ice cream or no snack  (fluids) water- says he needs to drink more    Physical Activity: walks outside  Frequency: 1-2 days per week       Nutrition Diagnosis  Behavioral and environmental: nutrition and nutrition-related knowledge deficit      Intervention  Comprehensive Nutrition Education Provided:    X- reviewed signs and symptoms of hypoglycemia due to rule of 15 for treating hypoglycemia (due to glyburide and increased physical activity in upcoming months.    [x] discussed healthy weight management, glucose management, lipid management, and BP management as related to diabetes   [x] discussed basic meal planning guidelines for diabetes regular mealtime, limited concentrated sweets. Worked on establishing eating pattern/timing of meals and snacks    [x] discussed in depth meal planning using the healthy eating with diabetes plate method with focus on balanced macronutrient consumption, including identifying foods that are carbohydrates, lean protein, non-starchy vegetables, and heart healthy fats   [x] stressed importance of carbohydrate consistency in each meal   [x] recommended carbohydrate targets of 45 grams at meals and 0-20 grams at snacks   [x] educated on label reading   [x] educated on portion control; including use of measuring cups, spoons, or food scale   [x] provided suggestions for lower carb, healthy snacks   [] educated on importance of food monitoring and how to use food logs   [] discussed how to handle special occasions, dining out, and eating on the go   [x] discussed menu planning, healthy food shopping, cooking tips, and need to pre prepare foods    [] educated on emotional eating and being mindful at meals   []  reviewed other behavior modification strategies    [x]emphasized the need for small, sustainable changes and working on SMART goals to encourage sustainable behaviors    [] instructions on how to use helpful websites or nutrition/tracking apps reviewed    [] taught to use carbohydrate to insulin ratio and insulin sensitivity factor    [] discussed barriers and overcoming barriers to best achieve meal planning goals    [] discussed Mediterranean diet/DASH diet, as appropriate, to address lipids or BP   [] reviewed renal diet components and how to incorporate this with diabetes meal planning       Monitoring  blood sugars and hemoglobin A1c    Evaluation  Behavioral Goal(s) selected:   Healthy Eating    Support Plan:  The use of Diabetes Websites or Apps       Plan  Blood sugar goals: less than 130 mg/dl in the morning and less than 180 mg/dl 2 hours after meals.   Keep glucose tabs or fast acting carbohydrates with you for treatment of lows; for blood glucose levels less than 70 mg/dl, take 15 grams of fast acting carbohydrates (3-4 glucose tabs, 4 ounces of juice, or as discussed). Retest in 15 min. If not above 70 mg/dl, retreat. Call Deborah Zhou RD at [phone] (option 3) for problems/concerns. No follow-ups on file.     Deborah Zhou RD

## 2023-05-06 ENCOUNTER — OFFICE VISIT (OUTPATIENT)
Dept: DERMATOLOGY CLINIC | Facility: CLINIC | Age: 78
End: 2023-05-06

## 2023-05-06 DIAGNOSIS — L82.1 SEBORRHEIC KERATOSES: Primary | ICD-10-CM

## 2023-05-06 DIAGNOSIS — L57.0 ACTINIC KERATOSIS: ICD-10-CM

## 2023-05-06 DIAGNOSIS — H01.003 BLEPHARITIS OF EYELID OF RIGHT EYE, UNSPECIFIED EYELID, UNSPECIFIED TYPE: ICD-10-CM

## 2023-05-06 DIAGNOSIS — D22.9 MULTIPLE MELANOCYTIC NEVI: ICD-10-CM

## 2023-05-06 DIAGNOSIS — Z85.828 HISTORY OF NONMELANOMA SKIN CANCER: ICD-10-CM

## 2023-05-06 DIAGNOSIS — D23.9 BENIGN NEOPLASM OF SKIN, UNSPECIFIED LOCATION: ICD-10-CM

## 2023-05-06 DIAGNOSIS — L81.4 SOLAR LENTIGO: ICD-10-CM

## 2023-05-06 RX ORDER — NEOMYCIN SULFATE, POLYMYXIN B SULFATE, AND DEXAMETHASONE 3.5; 10000; 1 MG/G; [USP'U]/G; MG/G
OINTMENT OPHTHALMIC
COMMUNITY
Start: 2023-04-20

## 2023-11-13 ENCOUNTER — OFFICE VISIT (OUTPATIENT)
Dept: DERMATOLOGY CLINIC | Facility: CLINIC | Age: 78
End: 2023-11-13
Payer: COMMERCIAL

## 2023-11-13 DIAGNOSIS — L57.8 SUN-DAMAGED SKIN: ICD-10-CM

## 2023-11-13 DIAGNOSIS — L82.1 SEBORRHEIC KERATOSES: ICD-10-CM

## 2023-11-13 DIAGNOSIS — L57.0 ACTINIC KERATOSIS: Primary | ICD-10-CM

## 2023-11-13 DIAGNOSIS — H01.003 BLEPHARITIS OF EYELID OF RIGHT EYE, UNSPECIFIED EYELID, UNSPECIFIED TYPE: ICD-10-CM

## 2023-11-13 DIAGNOSIS — Z85.828 HISTORY OF NONMELANOMA SKIN CANCER: ICD-10-CM

## 2023-11-13 DIAGNOSIS — D23.9 BENIGN NEOPLASM OF SKIN, UNSPECIFIED LOCATION: ICD-10-CM

## 2023-11-13 DIAGNOSIS — L81.4 SOLAR LENTIGO: ICD-10-CM

## 2023-11-13 DIAGNOSIS — D22.9 MULTIPLE MELANOCYTIC NEVI: ICD-10-CM

## 2023-11-24 NOTE — PROGRESS NOTES
Paulo Sam is a 77year old male. HPI:     CC:    Chief Complaint   Patient presents with    Full Skin Exam     LOV 5/26/23. Patient present for FBSE. Denies any concerns at this time. Has personal Hx of AK's and SCC. 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    Unspecified essential hypertension       Past Surgical History:   Procedure Laterality Date    ADJ TISS Kenard Alek <10SQCM Right 09/21/2022    EXC SKIN MALIG 2.1-3CM FACE,FACIAL Right 09/21/2022    KNEE ARTHROSCOPY      REPR CMPL WND LID,NOS,EAR 2.5-7.5 Right 09/21/2022      Family History   Problem Relation Age of Onset    Cancer Father     Heart Disorder Mother     Hypertension Mother     Stroke Mother     Other (Other) Mother     Melanoma Other         malignant-unk relative     Heart Disease Brother         brother has hx of CABG      Social History     Socioeconomic History    Marital status:    Tobacco Use    Smoking status: Never    Smokeless tobacco: Never   Substance and Sexual Activity    Alcohol use: Not Currently     Comment: on occasion    Drug use: No   Other Topics Concern    Pt has a pacemaker No    Pt has a defibrillator No    Reaction to local anesthetic No        Current Outpatient Medications   Medication Sig Dispense Refill    neomycin-polymyxin-dexamethasone 3.5-14868-7.1 Ophthalmic Ointment APPLY 1 THIN LAYER IN RIGHT EYE TWICE DAILY      carvedilol 12.5 MG Oral Tab Take 1 tablet (12.5 mg total) by mouth 2 (two) times daily before meals. PREVIDENT 5000 BOOSTER PLUS 1.1 % Dental Paste USE TWICE DAILY DO NOT EAT OR DRINK FOR 30 MINUTES AFTER AS DIRECTED      metFORMIN HCl 1000 MG Oral Tab Take 1 tablet (1,000 mg total) by mouth 2 (two) times daily before meals.       Lisinopril-hydroCHLOROthiazide 20-12.5 MG Oral Tab Take 1 tablet by mouth. Glucose Blood (ACCU-CHEK COLLIN PLUS) In Vitro Strip USE 1 STRIP TO CHECK GLUCOSE TWICE DAILY AS DIRECTED      tamsulosin (FLOMAX) cap Take 1 capsule (0.4 mg total) by mouth daily. GlipiZIDE ER 2.5 MG Oral Tablet 24 Hr       DENTA 5000 PLUS 1.1 % Dental Cream       Atorvastatin Calcium 20 MG Oral Tab       aspirin 81 MG Oral Tab Take  by mouth. Take one tablet by mouth every day      HYDROcodone-acetaminophen (NORCO) 5-325 MG Oral Tab Take 1 tablet by mouth every 8 (eight) hours as needed for Pain. (Patient not taking: Reported on 12/23/2022) 10 tablet 0    sulfamethoxazole-trimethoprim -160 MG Oral Tab per tablet Take 1 tablet by mouth every 12 (twelve) hours. 14 tablet 0    BINAXNOW COVID-19 AG HOME TEST In Vitro Kit TEST AS DIRECTED TODAY      Glucose Blood In Vitro Strip 1 each. MetFORMIN HCl 500 MG Oral Tab       Atorvastatin Calcium (LIPITOR) 40 MG Oral Tab daily. glyBURIDE (DIABETA) 2.5 MG Oral Tab daily with breakfast.   (Patient not taking: No sig reported)      Lisinopril-Hydrochlorothiazide 20-12.5 MG Oral Tab daily. (Patient not taking: Reported on 12/27/2021)       Allergies:    Allergies   Allergen Reactions    Penicillins UNKNOWN       Past Medical History:   Diagnosis Date    Actinic keratosis     Hyperlipemia     Hypertelorism     SCC (squamous cell carcinoma) 08/2022    Right vertex    SCC (squamous cell carcinoma) 64/9898    Right helical rim    Unspecified essential hypertension      Past Surgical History:   Procedure Laterality Date    ADJ TISS XFER LID,NOS,EAR <10SQCM Right 09/21/2022    EXC SKIN MALIG 2.1-3CM FACE,FACIAL Right 09/21/2022    KNEE ARTHROSCOPY      REPR CMPL WND LID,NOS,EAR 2.5-7.5 Right 09/21/2022     Social History     Socioeconomic History    Marital status:      Spouse name: Not on file    Number of children: Not on file    Years of education: Not on file    Highest education level: Not on file Occupational History    Not on file   Tobacco Use    Smoking status: Never    Smokeless tobacco: Never   Substance and Sexual Activity    Alcohol use: Not Currently     Comment: on occasion    Drug use: No    Sexual activity: Not on file   Other Topics Concern    Grew up on a farm Not Asked    History of tanning Not Asked    Outdoor occupation Not Asked    Pt has a pacemaker No    Pt has a defibrillator No    Reaction to local anesthetic No   Social History Narrative    Not on file     Social Determinants of Health     Financial Resource Strain: Not on file   Food Insecurity: Not on file   Transportation Needs: Not on file   Physical Activity: Not on file   Stress: Not on file   Social Connections: Not on file   Housing Stability: Not on file     Family History   Problem Relation Age of Onset    Cancer Father     Heart Disorder Mother     Hypertension Mother     Stroke Mother     Other (Other) Mother     Melanoma Other         malignant-unk relative     Heart Disease Brother         brother has hx of CABG       There were no vitals filed for this visit. HPI:  Chief Complaint   Patient presents with    Full Skin Exam     LOV 5/26/23. Patient present for FBSE. Denies any concerns at this time. Has personal Hx of AK's and SCC. Follow-up notes redness over right lid cheeks in particular concern with right eyelid changes  History AK's SCC is noted    Follow-up history of AK's, SCC helical rim, excised with Dr. Melissa Casas, right vertex ,sun damage. Generally careful with sun protection wears sunscreen hat. Patient presents with concerns above. Family history melanoma    Patient has been in their usual state of health. Past notes/ records and appropriate/relevant lab results including pathology and past body maps reviewed. Including outside notes/ PCP notes as appropriate. Updated and new information noted in current visit. ROS:  Denies other relevant systemic complaints.       History, medications, allergies reviewed as noted. Physical Examination:     Well-developed well-nourished patient alert oriented in no acute distress. Exam performed, including scalp, head, neck, face,nails, hair, external eyes, including conjunctival mucosa, eyelids, lips external ears , arms, digits,palms. Multiple light to medium brown, well marginated, uniformly pigmented, macules and papules 6 mm and less scattered on exam. pigmented lesions examined with dermoscopy benign-appearing patterns. Waxy tannish keratotic papules scattered, cherry-red vascular papules scattered. See map today's date for lesions noted . See assessment and plan below for specific lesions. Otherwise remarkable for lesions as noted on map. See A/P  below for additional information:    Assessment / plan:    No orders of the defined types were placed in this encounter. Meds & Refills for this Visit:  Requested Prescriptions      No prescriptions requested or ordered in this encounter         Encounter Diagnoses   Name Primary? Actinic keratosis Yes    Solar lentigo     Multiple melanocytic nevi     Benign neoplasm of skin, unspecified location     Sun-damaged skin     History of nonmelanoma skin cancer     Seborrheic keratoses     Blepharitis of eyelid of right eye, unspecified eyelid, unspecified type        Waxy tan papule mid right lower lid observe seborrheic keratosis follow-up with oculoplastics if this becomes more bothersome monitor presently    Background of more irritation especially over the lower lid mild blepharitis over-the-counter scrubs or baby shampoo to cleanse area regularly. Follow-up with ophthalmology, consider additional topicals if worsening. Erythematous scaling keratotic papules noted at sites noted on map  Actinic Keratoses. Precancerous nature discussed. Sun protection, sunscreen/ blocks encouraged Lesions treated with cryo- . Biopsy if not resolved.     Temples brow right ear vertex scalp preauricular cheeks x20 extensive background lesions  continue regular follow-up sun protection    Lesion at left lateral orbit zygomatic area Pearly erythematous keratotic papule 5 mm. Overall stable continue careful monitoring recommend biopsy patient declines at this visit. 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  Continue sunscreen. Scattered nevi keratoses few papular lesions over the back, very few nevi over the lower extremities. Pigmented lesions without atypical features on dermoscopy    No other susupicious lesions on todays  exam.      Continue regular follow-up sunscreen and sun protection. Please refer to map for specific lesions. See additional diagnoses. Pros cons of various therapies, risks benefits discussed. Pathophysiology discussed with patient. Therapeutic options reviewed. See  Medications in grid. Instructions reviewed at length. Benign nevi, seborrheic  keratoses, cherry angiomas:  Reassurance regarding other benign skin lesions. Signs and symptoms of skin cancer, ABCDE's of melanoma discussed with patient. Sunscreen use, sun protection, self exams reviewed. Followup as noted RTC --- 4 to 6 months or p.r.n. Encounter Times   Including precharting, reviewing chart, prior notes obtaining history: 10 minutes, medical exam :10 minutes, notes on body map, plan, counseling 10minutes My total time spent caring for the patient on the day of the encounter: 30 minutes     The patient indicates understanding of these issues and agrees to the plan. The patient is asked to return as noted in follow-up/ above. This note was generated using Dragon voice recognition software.   Please contact me regarding any confusion resulting from errors in recognition. Oni Briggs

## 2024-01-22 ENCOUNTER — HOSPITAL ENCOUNTER (OUTPATIENT)
Age: 79
Discharge: HOME | End: 2024-01-22
Payer: MEDICARE

## 2024-01-22 ENCOUNTER — APPOINTMENT (OUTPATIENT)
Dept: GENERAL RADIOLOGY | Age: 79
End: 2024-01-22
Attending: NURSE PRACTITIONER
Payer: MEDICARE

## 2024-01-22 VITALS
HEART RATE: 71 BPM | RESPIRATION RATE: 18 BRPM | TEMPERATURE: 98 F | HEIGHT: 66 IN | OXYGEN SATURATION: 97 % | WEIGHT: 161 LBS | BODY MASS INDEX: 25.88 KG/M2 | DIASTOLIC BLOOD PRESSURE: 88 MMHG | SYSTOLIC BLOOD PRESSURE: 157 MMHG

## 2024-01-22 DIAGNOSIS — J06.9 UPPER RESPIRATORY VIRUS: Primary | ICD-10-CM

## 2024-01-22 PROCEDURE — 99203 OFFICE O/P NEW LOW 30 MIN: CPT | Performed by: NURSE PRACTITIONER

## 2024-01-22 PROCEDURE — 71046 X-RAY EXAM CHEST 2 VIEWS: CPT | Performed by: NURSE PRACTITIONER

## 2024-01-22 NOTE — ED PROVIDER NOTES
Patient Seen in: [facility]      History     Chief Complaint   Patient presents with    Cough     Stated Complaint: Cough  Subjective:   78-year-old male presents for URI symptoms that started about 5 days ago.  His wife is sick with the same symptoms.  He denies any fevers or chills.  No chest pain or difficulty breathing.  He states his cough is productive with green and yellow sputum.  No swelling to his lower extremities.  He is eating and drinking without vomiting or diarrhea.  No neck stiffness.  No rashes.  No headaches.  No dizziness.  No ear or throat pain.  He appears nontoxic.  His wife took a COVID test at home yesterday which was negative.      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    Unspecified essential hypertension       Past Surgical History:   Procedure Laterality Date    ADJ TISS XFER LID,NOS,EAR <10SQCM Right 09/21/2022    EXC SKIN MALIG 2.1-3CM FACE,FACIAL Right 09/21/2022    KNEE ARTHROSCOPY      REPR CMPL WND LID,NOS,EAR 2.5-7.5 Right 09/21/2022      Family History   Problem Relation Age of Onset    Cancer Father     Heart Disorder Mother     Hypertension Mother     Stroke Mother     Other (Other) Mother     Melanoma Other         malignant-unk relative     Heart Disease Brother         brother has hx of CABG      Social History     Socioeconomic History    Marital status:    Tobacco Use    Smoking status: Never    Smokeless tobacco: Never   Substance and Sexual Activity    Alcohol use: Not Currently     Comment: on occasion    Drug use: No   Other Topics Concern    Pt has a pacemaker No    Pt has a defibrillator No    Reaction to local anesthetic No           Review of Systems   HENT:  Positive for congestion.    Respiratory:  Positive for cough.    All other systems reviewed and are  negative.      Positive for stated complaint: Cough  Other systems are as noted in HPI.  Constitutional and vital signs reviewed.      All other systems reviewed and negative except as noted above.    Physical Exam     ED Triage Vitals [01/22/24 1017]   /88   Pulse 71   Resp 18   Temp 97.5 °F (36.4 °C)   Temp src Oral   SpO2 97 %   O2 Device None (Room air)     Current:/88   Pulse 71   Temp 97.5 °F (36.4 °C) (Oral)   Resp 18   Ht 167.6 cm (5' 6\")   Wt 73 kg   SpO2 97%   BMI 25.99 kg/m²     Physical Exam  Vitals and nursing note reviewed.   Constitutional:       General: He is not in acute distress.     Appearance: Normal appearance. He is not toxic-appearing.   HENT:      Head: Normocephalic.      Right Ear: Tympanic membrane normal.      Left Ear: Tympanic membrane normal.      Nose: Congestion present. No rhinorrhea.      Mouth/Throat:      Mouth: Mucous membranes are moist.      Pharynx: Oropharynx is clear. No oropharyngeal exudate or posterior oropharyngeal erythema.   Eyes:      Extraocular Movements: Extraocular movements intact.      Conjunctiva/sclera: Conjunctivae normal.      Pupils: Pupils are equal, round, and reactive to light.   Cardiovascular:      Rate and Rhythm: Normal rate and regular rhythm.      Heart sounds: No murmur heard.  Pulmonary:      Effort: Pulmonary effort is normal.      Breath sounds: Normal breath sounds. No wheezing, rhonchi or rales.   Musculoskeletal:         General: Normal range of motion.      Cervical back: Normal range of motion and neck supple.   Lymphadenopathy:      Cervical: No cervical adenopathy.   Skin:     General: Skin is warm and dry.      Capillary Refill: Capillary refill takes less than 2 seconds.   Neurological:      General: No focal deficit present.      Mental Status: He is alert and oriented to person, place, and time.      Cranial Nerves: No cranial nerve deficit.   Psychiatric:         Mood and Affect: Mood normal.         Behavior:  Behavior normal.         ED Course   Labs Reviewed - No data to display  XR CHEST PA + LAT CHEST (CPT=71046)          PROCEDURE: XR CHEST PA + LAT CHEST (CPT=71046)     COMPARISON: None.     INDICATIONS: Productive cough for 5 days.     TECHNIQUE:   Two views.       FINDINGS:  Normal heart size, clear lungs, normal pleura              =====  CONCLUSION: Normal           Dictated by (CST): Mitchel Reich MD on 1/22/2024 at 10:46 AM       Finalized by (CST): Mitchel Reich MD on 1/22/2024 at 10:46 AM                     [facility]       Medical Decision Making  The chest x-ray is negative.  The patient is aware.  His symptoms are most likely viral.  We discussed plan of care including Tylenol as needed for pain or fever, pushing fluids, and rest.  He will continue to take Robitussin as needed for cough.  He will follow-up with his primary care doctor if her symptoms persist.    Amount and/or Complexity of Data Reviewed  Radiology: ordered and independent interpretation performed.     Details: The chest x-ray is negative for any pneumonia or other acute process.    Risk  OTC drugs.        Disposition and Plan     Clinical Impression:  1. Upper respiratory virus         Disposition:  Discharge  1/22/2024 10:52 am    Follow-up:  Sherman Bermudez MD  [address] [facility] [address] [phone]    Schedule an appointment as soon as possible for a visit   As needed          Medications Prescribed:  Discharge Medication List as of 1/22/2024 10:52 AM

## 2024-01-22 NOTE — DISCHARGE INSTRUCTIONS
Drink plenty of fluids.  Rest.  Tylenol as needed for pain or fever.  Follow-up as needed with your doctor if her symptoms persist.  Return for any concerns.

## 2024-04-05 ENCOUNTER — TELEPHONE (OUTPATIENT)
Dept: OTOLARYNGOLOGY | Facility: CLINIC | Age: 79
End: 2024-04-05

## 2024-04-05 ENCOUNTER — OFFICE VISIT (OUTPATIENT)
Dept: OTOLARYNGOLOGY | Facility: CLINIC | Age: 79
End: 2024-04-05
Payer: COMMERCIAL

## 2024-04-05 DIAGNOSIS — H91.90 HEARING LOSS, UNSPECIFIED HEARING LOSS TYPE, UNSPECIFIED LATERALITY: Primary | ICD-10-CM

## 2024-04-05 DIAGNOSIS — H93.222: ICD-10-CM

## 2024-04-05 PROCEDURE — 1159F MED LIST DOCD IN RCRD: CPT | Performed by: OTOLARYNGOLOGY

## 2024-04-05 PROCEDURE — 1160F RVW MEDS BY RX/DR IN RCRD: CPT | Performed by: OTOLARYNGOLOGY

## 2024-04-05 PROCEDURE — 99213 OFFICE O/P EST LOW 20 MIN: CPT | Performed by: OTOLARYNGOLOGY

## 2024-04-05 RX ORDER — MONTELUKAST SODIUM 10 MG/1
10 TABLET ORAL NIGHTLY
Qty: 30 TABLET | Refills: 3 | Status: SHIPPED | OUTPATIENT
Start: 2024-04-05

## 2024-04-05 RX ORDER — METHYLPREDNISOLONE 4 MG/1
TABLET ORAL
Qty: 21 TABLET | Refills: 0 | Status: SHIPPED | OUTPATIENT
Start: 2024-04-05

## 2024-04-05 RX ORDER — FLUTICASONE PROPIONATE 50 MCG
1 SPRAY, SUSPENSION (ML) NASAL 2 TIMES DAILY
Qty: 16 G | Refills: 3 | Status: SHIPPED | OUTPATIENT
Start: 2024-04-05

## 2024-04-05 NOTE — PROGRESS NOTES
Mitchel Longo is a 78 year old male.    Chief Complaint   Patient presents with    Ear Problem     Patient is here due to echo in ear. Reports dizziness. Denies hearing test at this time.        HISTORY OF PRESENT ILLNESS  He presents with a long history of a lesion that was frozen several times in the past on his helical rim on the right with a more recent biopsy demonstrating squamous cell carcinoma in situ at least.  Margins positive at the deep margin.  Here to discuss further management.  Long history of sun damage.  Sent to me by Dr. Lamb for my opinion regarding his skin cancer.     9/29/22 8 days out from excision of a squamous cell carcinoma in situ of the helical rim superiorly on the right with full-thickness skin graft reconstruction.  Here to have his dressings removed.  No complaints or concerns no significant pain or discomfort.  Path revealed no residual cancer at the time.     2/2/23 doing very well 4 months out from excision of large squamous cell carcinoma from the helical rim superiorly on the right side with reconstruction using a full-thickness skin graft.  He is very happy with the results of his surgery stating he can barely even see the site over the excision and reconstruction.  Here for routine follow-up.    4/6/24 he flew recently and noted that he had some pressure discomfort in his ear upon landing and developed some dizziness.  Typically this occurs in he will recover within 24 hours of a flight at this time upon returning to [place] he noted that his symptoms continued.  Continues to have an echo in his ear on the left but no longer having the dizziness.  He does complain of some congestive issues.  No other signs, symptoms or complaints but underlying history of allergy issues that have been relatively untreated.    Social History     Socioeconomic History    Marital status:    Tobacco Use    Smoking status: Never    Smokeless tobacco: Never   Substance and Sexual Activity     Alcohol use: Not Currently     Comment: on occasion    Drug use: No   Other Topics Concern    Pt has a pacemaker No    Pt has a defibrillator No    Reaction to local anesthetic No       Family History   Problem Relation Age of Onset    Cancer Father     Heart Disorder Mother     Hypertension Mother     Stroke Mother     Other (Other) Mother     Melanoma Other         malignant-unk relative     Heart Disease Brother         brother has hx of CABG       Past Medical History:   Diagnosis Date    Actinic keratosis     Hyperlipemia     Hypertelorism     SCC (squamous cell carcinoma) 08/2022    Right vertex    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   ENMT Negative Drooling.   Eyes Negative Blurred vision and vision changes.   Respiratory Negative Dyspnea and wheezing.   Cardio Negative Chest pain, irregular heartbeat/palpitations and syncope.   GI Negative Abdominal pain and diarrhea.   Endocrine Negative Cold intolerance and heat intolerance.   Neuro Negative Tremors.   Psych Negative Anxiety and depression.   Integumentary Negative Frequent skin infections, pigment change and rash.   Hema/Lymph Negative Easy bleeding and easy bruising.           PHYSICAL EXAM    There were no vitals taken for this visit.       Constitutional Normal Overall appearance - Normal.   Psychiatric Normal Orientation - Oriented to time, place, person & situation. Appropriate mood and affect.   Neck Exam Normal Inspection - Normal. Palpation - Normal. Parotid gland - Normal. Thyroid gland - Normal.   Eyes Normal Conjunctiva - Right:  Normal, Left: Normal. Pupil - Right: Normal, Left: Normal. Fundus - Right: Normal, Left: Normal.   Neurological Normal Memory - Normal. Cranial nerves - Cranial nerves II through XII grossly intact.   Head/Face Normal Facial features - Normal. Eyebrows - Normal. Skull - Normal.        Nasopharynx Normal External nose - Normal. Lips/teeth/gums - Normal. Tonsils - Normal. Oropharynx - Normal.   Ears Normal Inspection - Right: Normal, Left: Normal. Canal - Right: Normal, Left: Normal. TM - Right: Normal, Left: Normal.   Skin Normal Inspection - Normal.        Lymph Detail Normal Submental. Submandibular. Anterior cervical. Posterior cervical. Supraclavicular.        Nose/Mouth/Throat Normal External nose - Normal. Lips/teeth/gums - Normal. Tonsils - Normal. Oropharynx - Normal.   Nose/Mouth/Throat Normal Nares - Right: Normal Left: Normal. Septum -Normal  Turbinates - Right: Normal, Left: Normal.       Current Outpatient Medications:     methylPREDNISolone 4 MG Oral Tablet Therapy Pack, Take by oral route., Disp: 21 tablet, Rfl: 0    loratadine-pseudoephedrine ER 5-120 MG Oral Tablet 12 Hr, Take 1 tablet by mouth every 12 (twelve) hours., Disp: 60 tablet, Rfl: 3    montelukast 10 MG Oral Tab, Take 1 tablet (10 mg total) by mouth nightly., Disp: 30 tablet, Rfl: 3    fluticasone propionate 50 MCG/ACT Nasal Suspension, 1 spray by Nasal route 2 (two) times daily., Disp: 16 g, Rfl: 3    neomycin-polymyxin-dexamethasone 3.5-53118-2.1 Ophthalmic Ointment, APPLY 1 THIN LAYER IN RIGHT EYE TWICE DAILY, Disp: , Rfl:     carvedilol 12.5 MG Oral Tab, Take 1 tablet (12.5 mg total) by mouth 2 (two) times daily before meals., Disp: , Rfl:     PREVIDENT 5000 BOOSTER PLUS 1.1 % Dental Paste, USE TWICE DAILY DO NOT EAT OR DRINK FOR 30 MINUTES AFTER AS DIRECTED, Disp: , Rfl:     metFORMIN HCl 1000 MG Oral Tab, Take 1 tablet (1,000 mg total) by mouth 2 (two) times daily before meals., Disp: , Rfl:     Lisinopril-hydroCHLOROthiazide 20-12.5  MG Oral Tab, Take 1 tablet by mouth., Disp: , Rfl:     Glucose Blood (ACCU-CHEK CLOLIN PLUS) In Vitro Strip, USE 1 STRIP TO CHECK GLUCOSE TWICE DAILY AS DIRECTED, Disp: , Rfl:     tamsulosin (FLOMAX) cap, Take 1 capsule (0.4 mg total) by mouth daily., Disp: , Rfl:     GlipiZIDE ER 2.5 MG Oral Tablet 24 Hr, , Disp: , Rfl:     DENTA 5000 PLUS 1.1 % Dental Cream, , Disp: , Rfl:     Atorvastatin Calcium 20 MG Oral Tab, , Disp: , Rfl:     Lisinopril-Hydrochlorothiazide 20-12.5 MG Oral Tab, daily., Disp: , Rfl:     aspirin 81 MG Oral Tab, Take  by mouth. Take one tablet by mouth every day, Disp: , Rfl:     HYDROcodone-acetaminophen (NORCO) 5-325 MG Oral Tab, Take 1 tablet by mouth every 8 (eight) hours as needed for Pain. (Patient not taking: Reported on 12/23/2022), Disp: 10 tablet, Rfl: 0    sulfamethoxazole-trimethoprim -160 MG Oral Tab per tablet, Take 1 tablet by mouth every 12 (twelve) hours., Disp: 14 tablet, Rfl: 0    BINAXNOW COVID-19 AG HOME TEST In Vitro Kit, TEST AS DIRECTED TODAY, Disp: , Rfl:     Glucose Blood In Vitro Strip, 1 each., Disp: , Rfl:     MetFORMIN HCl 500 MG Oral Tab, , Disp: , Rfl:     Atorvastatin Calcium (LIPITOR) 40 MG Oral Tab, daily.  , Disp: , Rfl:     glyBURIDE (DIABETA) 2.5 MG Oral Tab, daily with breakfast.   (Patient not taking: No sig reported), Disp: , Rfl:   ASSESSMENT AND PLAN    1. Hearing loss, unspecified hearing loss type, unspecified laterality  - Audiology Referral - [place] ([facility])    2. Echo diplacusis, left  He may have eustachian tube dysfunction.  I did recommend that he start Medrol Dosepak Claritin-D Singulair and fluticasone nasal spray.  He will return to see me in 1 month for reevaluation.  - loratadine-pseudoephedrine ER 5-120 MG Oral Tablet 12 Hr; Take 1 tablet by mouth every 12 (twelve) hours.  Dispense: 60 tablet; Refill: 3        This note was prepared using Dragon Medical voice recognition dictation software. As a result errors may occur.  When identified these errors have been corrected. While every attempt is made to correct errors during dictation discrepancies may still exist    Martin Obrien MD    4/5/2024    3:07 PM

## 2024-04-05 NOTE — TELEPHONE ENCOUNTER
Pt's wife called.  Appointment today 4-5-24.  [facility] has not received the three rx.  Please send and call pt

## 2024-04-22 ENCOUNTER — APPOINTMENT (OUTPATIENT)
Dept: GENERAL RADIOLOGY | Age: 79
End: 2024-04-22
Attending: PHYSICIAN ASSISTANT
Payer: MEDICARE

## 2024-04-22 ENCOUNTER — HOSPITAL ENCOUNTER (OUTPATIENT)
Age: 79
Discharge: HOME | End: 2024-04-22
Payer: MEDICARE

## 2024-04-22 VITALS
TEMPERATURE: 97 F | OXYGEN SATURATION: 98 % | SYSTOLIC BLOOD PRESSURE: 172 MMHG | HEART RATE: 72 BPM | RESPIRATION RATE: 18 BRPM | DIASTOLIC BLOOD PRESSURE: 100 MMHG

## 2024-04-22 DIAGNOSIS — J40 BRONCHITIS: Primary | ICD-10-CM

## 2024-04-22 PROCEDURE — 71046 X-RAY EXAM CHEST 2 VIEWS: CPT | Performed by: PHYSICIAN ASSISTANT

## 2024-04-22 PROCEDURE — 99213 OFFICE O/P EST LOW 20 MIN: CPT | Performed by: PHYSICIAN ASSISTANT

## 2024-04-22 RX ORDER — GUAIFENESIN AND DEXTROMETHORPHAN HYDROBROMIDE 1200; 60 MG/1; MG/1
1 TABLET, EXTENDED RELEASE ORAL EVERY 12 HOURS
Qty: 10 TABLET | Refills: 0 | Status: SHIPPED | OUTPATIENT
Start: 2024-04-22 | End: 2024-04-27

## 2024-04-22 RX ORDER — BENZONATATE 100 MG/1
100 CAPSULE ORAL 3 TIMES DAILY PRN
Qty: 21 CAPSULE | Refills: 0 | Status: SHIPPED | OUTPATIENT
Start: 2024-04-22 | End: 2024-04-29

## 2024-04-22 NOTE — ED INITIAL ASSESSMENT (HPI)
Pt reports feeling sick x 2-3 weeks. No fevers. + productive cough. Pt was prescribed medications by dr hilario a few days ago.

## 2024-04-22 NOTE — ED PROVIDER NOTES
Chief Complaint   Patient presents with    Cough/URI       HPI:     Mitchel Longo is a 78 year old male who presents for evaluation of intermittent chest congestion and cough over the last 3 weeks.  Notes his ENT just prior to onset for allergy concerns put on a steroid tapering pack as well as montelukast and Zyrtec pseudoephedrine.  Patient taking periodic Robitussin with mild improvement.  Notes productive cough with yellow mucus.  Denies history of lung issues including bronchitis pneumonia or asthma.  Non-smoker.  Denies associated fevers chills headache dizziness sore throat neck pain chest pain shortness of breath abdominal pain vomiting diarrhea dysuria or rash.      PFSH    PFSH asessment screens reviewed and agree.  Nurses notes reviewed I agree with documentation.    Family History   Problem Relation Age of Onset    Cancer Father     Heart Disorder Mother     Hypertension Mother     Stroke Mother     Other (Other) Mother     Melanoma Other         malignant-unk relative     Heart Disease Brother         brother has hx of CABG     Family history reviewed with patient/caregiver and is not pertinent to presenting problem.  Social History     Socioeconomic History    Marital status:      Spouse name: Not on file    Number of children: Not on file    Years of education: Not on file    Highest education level: Not on file   Occupational History    Not on file   Tobacco Use    Smoking status: Never    Smokeless tobacco: Never   Substance and Sexual Activity    Alcohol use: Not Currently     Comment: on occasion    Drug use: No    Sexual activity: Not on file   Other Topics Concern    Grew up on a farm Not Asked    History of tanning Not Asked    Outdoor occupation Not Asked    Pt has a pacemaker No    Pt has a defibrillator No    Reaction to local anesthetic No   Social History Narrative    Not on file     Social Determinants of Health     Financial Resource Strain: Not on file   Food Insecurity: Not on  file   Transportation Needs: Not on file   Physical Activity: Not on file   Stress: Not on file   Social Connections: Not on file   Housing Stability: Not on file         ROS:   Positive for stated complaint: Chest congestion  All other systems reviewed and negative except as noted above.  Constitutional and Vital Signs Reviewed.      Physical Exam:     Findings:    BP (!) 172/100   Pulse 72   Temp 97.1 °F (36.2 °C) (Temporal)   Resp 18   SpO2 98%   GENERAL: well developed, well nourished, well hydrated, no distress  SKIN: good skin turgor, no obvious rashes  NECK:NO  JVD.  Supple, no adenopathy  CARDIO: RRR without murmur  EXTREMITIES: no cyanosis or edema. LOO without difficulty  GI: soft, non-tender, normal bowel sounds  HEAD: normocephalic, atraumatic  EYES: sclera non icteric bilateral, conjunctiva clear  EARS: TMs clear bilaterally. Canals clear.  NOSE:.  No rhinorrhea MMM.  Nasal turbinates: pink, normal mucosa  THROAT: clear, without exudates, uvula midline, and airway patent  LUNGS: No retractions.  Clear to auscultation bilaterally; no rales, rhonchi, or wheezes  NEURO: No focal deficits  PSYCH: Alert and oriented x3.  Answering questions appropriately.  Mood appropriate.    MDM/Assessment/Plan:   Orders for this encounter:    Orders Placed This Encounter    XR CHEST PA + LAT CHEST (SLF=27605)     Order Specific Question:   What is the Relevant Clinical Indication / Reason for Exam?     Answer:   CHEST CONGESTION     Order Specific Question:   Release to patient     Answer:   Immediate    dextromethorphan-guaifenesin ER (MUCINEX DM MAXIMUM STRENGTH)  MG Oral Tablet 12 Hr     Sig: Take 1 tablet by mouth every 12 (twelve) hours for 5 days.     Dispense:  10 tablet     Refill:  0    benzonatate 100 MG Oral Cap     Sig: Take 1 capsule (100 mg total) by mouth 3 (three) times daily as needed for cough.     Dispense:  21 capsule     Refill:  0       Labs performed this visit:  No results found for this  or any previous visit (from the past 10 hour(s)).    MDM:  Chest x-ray without infiltrate, patient agrees with supportive measures for likely bronchitis and ongoing viral process.  Instructed on home care follow-up as well as indications to return.    Diagnosis:    ICD-10-CM    1. Bronchitis  J40 XR CHEST PA + LAT CHEST (CPT=71046)     XR CHEST PA + LAT CHEST (CPT=71046)          All results reviewed and discussed with patient.  See AVS for detailed discharge instructions for your condition today.    Follow Up with:  Keron Eubanks MD  [address] [place] [id number]  [phone]    Schedule an appointment as soon as possible for a visit in 1 week  As needed, If symptoms worsen

## 2024-05-21 ENCOUNTER — ORDER TRANSCRIPTION (OUTPATIENT)
Dept: ADMINISTRATIVE | Facility: HOSPITAL | Age: 79
End: 2024-05-21

## 2024-05-21 DIAGNOSIS — I10 HYPERTENSION: Primary | ICD-10-CM

## 2024-06-13 ENCOUNTER — HOSPITAL ENCOUNTER (OUTPATIENT)
Dept: CT IMAGING | Age: 79
Discharge: HOME | End: 2024-06-13
Attending: STUDENT IN AN ORGANIZED HEALTH CARE EDUCATION/TRAINING PROGRAM

## 2024-06-13 ENCOUNTER — ORDER TRANSCRIPTION (OUTPATIENT)
Dept: ADMINISTRATIVE | Facility: HOSPITAL | Age: 79
End: 2024-06-13

## 2024-06-13 DIAGNOSIS — Z13.9 SCREENING FOR CONDITION: Primary | ICD-10-CM

## 2024-06-13 DIAGNOSIS — I10 HYPERTENSION: ICD-10-CM

## 2024-06-13 NOTE — PROGRESS NOTES
Date of Service 6/13/2024    MALDONADO TANG  Date of Birth 7/6/1945    Patient Age: 78 year old    PCP: Keron Eubanks MD  [facility] [id number]    Heart Scan Consult  Preliminary Heart Scan Score: 406    Previous Screening  Heart Scan Completed Previously: No        Peripheral Vascular Scan Completed Previously: No          Risk Factors  Personal Risk Factors  Non-alterable Risk Factors: Family History (Patient is a triplet and both of his brothers have had bypasses in their 60's.)  Alterable Risk Factors: High Blood Pressure;Abnormal Cholesterol;Diabetes      Body Mass Index  BMI 26    Blood Pressure  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  He takes his BP at home and it runs much lower. Today at home 117/77, he is on med.  (Normal =< 120/80,  Elevated = 120-129/ >80,  High Stage1 130-139/80-89 , Stage2 >140/>90)    Lipid Profile  5/30/2024  Total - 136  LDL - 73  HDL - 49  Triglycerides - 87    Cholesterol Goals  Value   Total  =< 200   HDL  = > 45 Men = > 55 Women   LDL   =< 100   Triglycerides  =< 150       Glucose and Hemoglobin A1C  Type II DM on med.  A1c  7.0, 11/2023  Lab Results   Component Value Date     (H) 11/08/2022    A1C 6.6 (H) 11/08/2022     (Normal Fasting Glucose < 100mg/dl )    Nurse Review  Risk factor information and results reviewed with Nurse: Yes    Recommended Follow Up:  Consult your physician regarding:: Final Heart Scan Report;Discuss potential for Incidental Finding      Recommendations for Change:  Nutrition Changes: Low Saturated Fat    Cholesterol Modification (goal of therapy depends upon your risk): No Change Needed    Exercise: Enhance Current Program    Smoking Cessation: No Change Needed    Weight Management: Decrease Current Weight    Stress Management: Adopt Stress Management Techniques    Repeat Heart Scan: 3 Years if Calcium Score is > 0.0;Discuss with your Physician              [facility] Recommended Resources:  Recommended Resources:  PV Screening;Upcoming Classes, Medical Services and Health Library [email or web address]  Recommended PV Screening: Abdomen;Ankle-Brachial Index (STEVEN);Carotids         Nancy ORNELAS RN        Please Contact the Nurse Heart Line with any Questions or Concerns [phone].

## 2024-06-21 ENCOUNTER — HOSPITAL ENCOUNTER (OUTPATIENT)
Dept: ULTRASOUND IMAGING | Age: 79
Discharge: HOME | End: 2024-06-21
Attending: STUDENT IN AN ORGANIZED HEALTH CARE EDUCATION/TRAINING PROGRAM

## 2024-06-21 DIAGNOSIS — Z13.9 SCREENING FOR CONDITION: ICD-10-CM

## 2024-08-12 ENCOUNTER — OFFICE VISIT (OUTPATIENT)
Dept: DERMATOLOGY CLINIC | Facility: CLINIC | Age: 79
End: 2024-08-12
Payer: COMMERCIAL

## 2024-08-12 DIAGNOSIS — D22.9 MULTIPLE MELANOCYTIC NEVI: ICD-10-CM

## 2024-08-12 DIAGNOSIS — D23.9 BENIGN NEOPLASM OF SKIN, UNSPECIFIED LOCATION: ICD-10-CM

## 2024-08-12 DIAGNOSIS — L81.4 SOLAR LENTIGO: ICD-10-CM

## 2024-08-12 DIAGNOSIS — Z08 ENCOUNTER FOR FOLLOW-UP SURVEILLANCE OF SKIN CANCER: ICD-10-CM

## 2024-08-12 DIAGNOSIS — L57.0 ACTINIC KERATOSIS: Primary | ICD-10-CM

## 2024-08-12 DIAGNOSIS — L57.8 SUN-DAMAGED SKIN: ICD-10-CM

## 2024-08-12 DIAGNOSIS — Z85.828 ENCOUNTER FOR FOLLOW-UP SURVEILLANCE OF SKIN CANCER: ICD-10-CM

## 2024-08-12 PROCEDURE — 1159F MED LIST DOCD IN RCRD: CPT | Performed by: DERMATOLOGY

## 2024-08-12 PROCEDURE — 17004 DESTROY PREMAL LESIONS 15/>: CPT | Performed by: DERMATOLOGY

## 2024-08-12 PROCEDURE — 99213 OFFICE O/P EST LOW 20 MIN: CPT | Performed by: DERMATOLOGY

## 2024-08-12 PROCEDURE — 1160F RVW MEDS BY RX/DR IN RCRD: CPT | Performed by: DERMATOLOGY

## 2024-08-14 ENCOUNTER — PATIENT MESSAGE (OUTPATIENT)
Dept: DERMATOLOGY CLINIC | Facility: CLINIC | Age: 79
End: 2024-08-14

## 2024-08-14 NOTE — TELEPHONE ENCOUNTER
From: Mitchel Longo  To: Vaelrie Lamb  Sent: 8/14/2024 12:02 PM CDT  Subject: After visit summary of 8/12/24    I do not see any After Visit Summary for above mentioned office visit .

## 2024-08-19 NOTE — PROGRESS NOTES
Mitchel Longo is a 79 year old male.  HPI:     CC:    Chief Complaint   Patient presents with    Derm Problem     LOV 11/13/23. Pt presents for AK's on face and top of scalp. Previously used cryo.    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    Unspecified essential hypertension      Past Surgical History:   Procedure Laterality Date    Adj tiss xfer lid,nos,ear <10sqcm Right 09/21/2022    Exc skin malig 2.1-3cm face,facial Right 09/21/2022    Knee arthroscopy      Repr cmpl wnd lid,nos,ear 2.5-7.5 Right 09/21/2022      Family History   Problem Relation Age of Onset    Cancer Father     Heart Disorder Mother     Hypertension Mother     Stroke Mother     Other (Other) Mother     Melanoma Other         malignant-unk relative     Heart Disease Brother         brother has hx of CABG      Social History     Socioeconomic History    Marital status:    Tobacco Use    Smoking status: Never    Smokeless tobacco: Never   Substance and Sexual Activity    Alcohol use: Not Currently     Comment: on occasion    Drug use: No   Other Topics Concern    Pt has a pacemaker No    Pt has a defibrillator No    Reaction to local anesthetic No        Current Outpatient Medications   Medication Sig Dispense Refill    montelukast 10 MG Oral Tab Take 1 tablet (10 mg total) by mouth nightly. 30 tablet 3    fluticasone propionate 50 MCG/ACT Nasal Suspension 1 spray by Nasal route 2 (two) times daily. 16 g 3    carvedilol 12.5 MG Oral Tab Take 1 tablet (12.5 mg total) by mouth 2 (two) times daily before meals.      PREVIDENT 5000 BOOSTER PLUS 1.1 % Dental Paste USE TWICE DAILY DO NOT EAT OR DRINK FOR 30 MINUTES AFTER AS DIRECTED      metFORMIN HCl 1000 MG Oral Tab Take 1 tablet (1,000 mg total) by mouth 2 (two) times daily  before meals.      Lisinopril-hydroCHLOROthiazide 20-12.5 MG Oral Tab Take 1 tablet by mouth.      Glucose Blood (ACCU-CHEK COLLIN PLUS) In Vitro Strip USE 1 STRIP TO CHECK GLUCOSE TWICE DAILY AS DIRECTED      tamsulosin (FLOMAX) cap Take 1 capsule (0.4 mg total) by mouth daily.      GlipiZIDE ER 2.5 MG Oral Tablet 24 Hr       Atorvastatin Calcium 20 MG Oral Tab       aspirin 81 MG Oral Tab Take  by mouth. Take one tablet by mouth every day      methylPREDNISolone 4 MG Oral Tablet Therapy Pack Take by oral route. 21 tablet 0    loratadine-pseudoephedrine ER 5-120 MG Oral Tablet 12 Hr Take 1 tablet by mouth every 12 (twelve) hours. 60 tablet 3    neomycin-polymyxin-dexamethasone 3.5-87025-2.1 Ophthalmic Ointment APPLY 1 THIN LAYER IN RIGHT EYE TWICE DAILY (Patient not taking: Reported on 8/12/2024)      HYDROcodone-acetaminophen (NORCO) 5-325 MG Oral Tab Take 1 tablet by mouth every 8 (eight) hours as needed for Pain. (Patient not taking: Reported on 12/23/2022) 10 tablet 0    sulfamethoxazole-trimethoprim -160 MG Oral Tab per tablet Take 1 tablet by mouth every 12 (twelve) hours. 14 tablet 0    BINAXNOW COVID-19 AG HOME TEST In Vitro Kit TEST AS DIRECTED TODAY      Glucose Blood In Vitro Strip 1 each.      MetFORMIN HCl 500 MG Oral Tab       DENTA 5000 PLUS 1.1 % Dental Cream       Atorvastatin Calcium (LIPITOR) 40 MG Oral Tab daily.        glyBURIDE (DIABETA) 2.5 MG Oral Tab daily with breakfast.   (Patient not taking: No sig reported)      Lisinopril-Hydrochlorothiazide 20-12.5 MG Oral Tab daily.       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    Unspecified essential hypertension     Past Surgical History:   Procedure Laterality Date    Adj tiss xfer lid,nos,ear <10sqcm Right 09/21/2022    Exc skin  malig 2.1-3cm face,facial Right 09/21/2022    Knee arthroscopy      Repr cmpl wnd lid,nos,ear 2.5-7.5 Right 09/21/2022     Social History     Socioeconomic History    Marital status:      Spouse name: Not on file    Number of children: Not on file    Years of education: Not on file    Highest education level: Not on file   Occupational History    Not on file   Tobacco Use    Smoking status: Never    Smokeless tobacco: Never   Substance and Sexual Activity    Alcohol use: Not Currently     Comment: on occasion    Drug use: No    Sexual activity: Not on file   Other Topics Concern    Grew up on a farm Not Asked    History of tanning Not Asked    Outdoor occupation Not Asked    Pt has a pacemaker No    Pt has a defibrillator No    Reaction to local anesthetic No   Social History Narrative    Not on file     Social Determinants of Health     Financial Resource Strain: Not on file   Food Insecurity: Not on file   Transportation Needs: Not on file   Physical Activity: Not on file   Stress: Not on file   Social Connections: Not on file   Housing Stability: Not on file     Family History   Problem Relation Age of Onset    Cancer Father     Heart Disorder Mother     Hypertension Mother     Stroke Mother     Other (Other) Mother     Melanoma Other         malignant-unk relative     Heart Disease Brother         brother has hx of CABG       There were no vitals filed for this visit.    HPI:  Chief Complaint   Patient presents with    Derm Problem     LOV 11/13/23. Pt presents for AK's on face and top of scalp. Previously used cryo.    Pt has personal hx of Aks and SCC       History AK's SCC is noted    Follow-up history of AK's, SCC helical rim, excised with Dr. Obrien, right vertex ,sun damage.  Generally careful with sun protection wears sunscreen hat.  Patient presents with concerns above.  Family history melanoma    Patient has been in their usual state of health.     Past notes/ records and appropriate/relevant lab  results including pathology and past body maps reviewed. Including outside notes/ PCP notes as appropriate. Updated and new information noted in current visit.     ROS:  Denies other relevant systemic complaints.      History, medications, allergies reviewed as noted.       Physical Examination:     Well-developed well-nourished patient alert oriented in no acute distress.  Exam performed, including scalp, head, neck, face,nails, hair, external eyes, including conjunctival mucosa, eyelids, lips external ears , arms, digits,palms.     Multiple light to medium brown, well marginated, uniformly pigmented, macules and papules 6 mm and less scattered on exam. pigmented lesions examined with dermoscopy benign-appearing patterns.     Waxy tannish keratotic papules scattered, cherry-red vascular papules scattered.    See map today's date for lesions noted .  See assessment and plan below for specific lesions.    Otherwise remarkable for lesions as noted on map.    See A/P  below for additional information:    Assessment / plan:    No orders of the defined types were placed in this encounter.      Meds & Refills for this Visit:  Requested Prescriptions      No prescriptions requested or ordered in this encounter         Encounter Diagnoses   Name Primary?    Actinic keratosis Yes    Solar lentigo     Multiple melanocytic nevi     Benign neoplasm of skin, unspecified location     Sun-damaged skin     Encounter for follow-up surveillance of skin cancer        Waxy tan papule mid right lower lid observe seborrheic keratosis follow-up with oculoplastics if this becomes more bothersome monitor presently    Background of more irritation especially over the lower lid mild blepharitis over-the-counter scrubs or baby shampoo to cleanse area regularly.  Follow-up with ophthalmology, consider additional topicals if worsening.      Erythematous scaling keratotic papules noted at sites noted on map  Actinic Keratoses.  Precancerous  nature discussed. Sun protection, sunscreen/ blocks encouraged Lesions treated with cryo- .  Biopsy if not resolved.    Scalp face ears X16  continue regular follow-up sun protection    Lesion at left lateral orbit zygomatic area Pearly erythematous keratotic papule 5 mm.  Overall stable continue careful monitoring recommend biopsy patient declines at this visit.  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  Continue sunscreen.    Scattered nevi keratoses few papular lesions over the back, very few nevi over the lower extremities.      Pigmented lesions without atypical features on dermoscopy    No other susupicious lesions on todays  exam.      Continue regular follow-up sunscreen and sun protection.  Please refer to map for specific lesions.  See additional diagnoses.  Pros cons of various therapies, risks benefits discussed.Pathophysiology discussed with patient.  Therapeutic options reviewed.  See  Medications in grid.  Instructions reviewed at length.    Benign nevi, seborrheic  keratoses, cherry angiomas:  Reassurance regarding other benign skin lesions.Signs and symptoms of skin cancer, ABCDE's of melanoma discussed with patient. Sunscreen use, sun protection, self exams reviewed.  Followup as noted RTC --- 4 to 6 months or p.r.n.    Encounter Times   Including precharting, reviewing chart, prior notes obtaining history: 10 minutes, medical exam :10 minutes, notes on body map, plan, counseling 10minutes My total time spent caring for the patient on the day of the encounter: 30 minutes     The patient indicates understanding of these issues and agrees to the plan.  The patient is asked to return as noted in follow-up/ above.    This note was generated using Dragon voice recognition  software.  Please contact me regarding any confusion resulting from errors in recognition..

## 2024-11-15 ENCOUNTER — OFFICE VISIT (OUTPATIENT)
Dept: DERMATOLOGY CLINIC | Facility: CLINIC | Age: 79
End: 2024-11-15
Payer: COMMERCIAL

## 2024-11-15 DIAGNOSIS — L81.4 SOLAR LENTIGO: ICD-10-CM

## 2024-11-15 DIAGNOSIS — L57.8 SUN-DAMAGED SKIN: ICD-10-CM

## 2024-11-15 DIAGNOSIS — Z85.828 ENCOUNTER FOR FOLLOW-UP SURVEILLANCE OF SKIN CANCER: ICD-10-CM

## 2024-11-15 DIAGNOSIS — L57.0 ACTINIC KERATOSIS: Primary | ICD-10-CM

## 2024-11-15 DIAGNOSIS — L82.1 SEBORRHEIC KERATOSES: ICD-10-CM

## 2024-11-15 DIAGNOSIS — D22.9 MULTIPLE MELANOCYTIC NEVI: ICD-10-CM

## 2024-11-15 DIAGNOSIS — Z08 ENCOUNTER FOR FOLLOW-UP SURVEILLANCE OF SKIN CANCER: ICD-10-CM

## 2024-11-15 DIAGNOSIS — D23.9 BENIGN NEOPLASM OF SKIN, UNSPECIFIED LOCATION: ICD-10-CM

## 2024-11-25 NOTE — PROGRESS NOTES
Mitchel Longo is a 79 year old male.  HPI:     CC:    Chief Complaint   Patient presents with    Full Skin Exam     LOV 08/12/24. Pt presents for FBSE. Pt denies any concerns.    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    Unspecified essential hypertension      Past Surgical History:   Procedure Laterality Date    Adj tiss xfer lid,nos,ear <10sqcm Right 09/21/2022    Exc skin malig 2.1-3cm face,facial Right 09/21/2022    Knee arthroscopy      Repr cmpl wnd lid,nos,ear 2.5-7.5 Right 09/21/2022      Family History   Problem Relation Age of Onset    Cancer Father     Heart Disorder Mother     Hypertension Mother     Stroke Mother     Other (Other) Mother     Melanoma Other         malignant-unk relative     Heart Disease Brother         brother has hx of CABG      Social History     Socioeconomic History    Marital status:    Tobacco Use    Smoking status: Never    Smokeless tobacco: Never   Substance and Sexual Activity    Alcohol use: Not Currently     Comment: on occasion    Drug use: No   Other Topics Concern    Pt has a pacemaker No    Pt has a defibrillator No    Reaction to local anesthetic No     Social Drivers of Health     Financial Resource Strain: Low Risk  (9/24/2024)    Received from [facility]    Overall Financial Resource Strain (CARDIA)     Difficulty of Paying Living Expenses: Not very hard   Food Insecurity: No Food Insecurity (9/24/2024)    Received from [facility]    Hunger Vital Sign     Worried About Running Out of Food in the Last Year: Never true     Ran Out of Food in the Last Year: Never true   Transportation Needs: No Transportation Needs (9/24/2024)    Received from [facility]     PRAPARE - Transportation     Lack of Transportation (Medical): No     Lack of Transportation (Non-Medical): No   Housing Stability: Low Risk  (9/24/2024)    Received from [facility]    Housing Stability Vital Sign     Unable to Pay for Housing in the Last Year: No     Number of Times Moved in the Last Year: 1     Homeless in the Last Year: No        Current Outpatient Medications   Medication Sig Dispense Refill    fluticasone propionate 50 MCG/ACT Nasal Suspension 1 spray by Nasal route 2 (two) times daily. 16 g 3    carvedilol 12.5 MG Oral Tab Take 1 tablet (12.5 mg total) by mouth 2 (two) times daily before meals.      PREVIDENT 5000 BOOSTER PLUS 1.1 % Dental Paste USE TWICE DAILY DO NOT EAT OR DRINK FOR 30 MINUTES AFTER AS DIRECTED      metFORMIN HCl 1000 MG Oral Tab Take 1 tablet (1,000 mg total) by mouth 2 (two) times daily before meals.      Lisinopril-hydroCHLOROthiazide 20-12.5 MG Oral Tab Take 1 tablet by mouth.      Glucose Blood (ACCU-CHEK COLLIN PLUS) In Vitro Strip USE 1 STRIP TO CHECK GLUCOSE TWICE DAILY AS DIRECTED      tamsulosin (FLOMAX) cap Take 1 capsule (0.4 mg total) by mouth daily.      GlipiZIDE ER 2.5 MG Oral Tablet 24 Hr       Atorvastatin Calcium 20 MG Oral Tab       aspirin 81 MG Oral Tab Take  by mouth. Take one tablet by mouth every day      methylPREDNISolone 4 MG Oral Tablet Therapy Pack Take by oral route. 21 tablet 0    loratadine-pseudoephedrine ER 5-120 MG Oral Tablet 12 Hr Take 1 tablet by mouth every 12 (twelve) hours. 60 tablet 3    montelukast 10 MG Oral Tab Take 1 tablet (10 mg total) by mouth nightly. 30 tablet 3    neomycin-polymyxin-dexamethasone 3.5-42268-9.1 Ophthalmic Ointment APPLY 1 THIN LAYER IN RIGHT EYE TWICE DAILY (Patient not taking: Reported on 8/12/2024)      HYDROcodone-acetaminophen (NORCO) 5-325 MG Oral Tab Take 1 tablet by mouth every 8 (eight) hours as needed for Pain. (Patient not taking: Reported on 12/23/2022) 10 tablet 0     sulfamethoxazole-trimethoprim -160 MG Oral Tab per tablet Take 1 tablet by mouth every 12 (twelve) hours. 14 tablet 0    BINAXNOW COVID-19 AG HOME TEST In Vitro Kit TEST AS DIRECTED TODAY      Glucose Blood In Vitro Strip 1 each.      MetFORMIN HCl 500 MG Oral Tab       DENTA 5000 PLUS 1.1 % Dental Cream       Atorvastatin Calcium (LIPITOR) 40 MG Oral Tab daily.        glyBURIDE (DIABETA) 2.5 MG Oral Tab daily with breakfast.   (Patient not taking: No sig reported)      Lisinopril-Hydrochlorothiazide 20-12.5 MG Oral Tab daily.       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    Unspecified essential hypertension     Past Surgical History:   Procedure Laterality Date    Adj tiss xfer lid,nos,ear <10sqcm Right 09/21/2022    Exc skin malig 2.1-3cm face,facial Right 09/21/2022    Knee arthroscopy      Repr cmpl wnd lid,nos,ear 2.5-7.5 Right 09/21/2022     Social History     Socioeconomic History    Marital status:      Spouse name: Not on file    Number of children: Not on file    Years of education: Not on file    Highest education level: Not on file   Occupational History    Not on file   Tobacco Use    Smoking status: Never    Smokeless tobacco: Never   Substance and Sexual Activity    Alcohol use: Not Currently     Comment: on occasion    Drug use: No    Sexual activity: Not on file   Other Topics Concern    Grew up on a farm Not Asked    History of tanning Not Asked    Outdoor occupation Not Asked    Pt has a pacemaker No    Pt has a defibrillator No    Reaction to local anesthetic No   Social History Narrative    Not on file     Social Drivers of Health     Financial Resource Strain: Low Risk  (9/24/2024)    Received from [facility]    Overall Financial Resource Strain  (CARDIA)     Difficulty of Paying Living Expenses: Not very hard   Food Insecurity: No Food Insecurity (9/24/2024)    Received from [facility]    Hunger Vital Sign     Worried About Running Out of Food in the Last Year: Never true     Ran Out of Food in the Last Year: Never true   Transportation Needs: No Transportation Needs (9/24/2024)    Received from [facility]    PRAPARE - Transportation     Lack of Transportation (Medical): No     Lack of Transportation (Non-Medical): No   Physical Activity: Not on file   Stress: Not on file   Social Connections: Not on file   Housing Stability: Low Risk  (9/24/2024)    Received from [facility]    Housing Stability Vital Sign     Unable to Pay for Housing in the Last Year: No     Number of Times Moved in the Last Year: 1     Homeless in the Last Year: No     Family History   Problem Relation Age of Onset    Cancer Father     Heart Disorder Mother     Hypertension Mother     Stroke Mother     Other (Other) Mother     Melanoma Other         malignant-unk relative     Heart Disease Brother         brother has hx of CABG       There were no vitals filed for this visit.    HPI:  Chief Complaint   Patient presents with    Full Skin Exam     LOV 08/12/24. Pt presents for FBSE. Pt denies any concerns.    Pt has hx of SCC (Rt vertex, Rt helical rim), AK, SK       History AK's SCC is noted    Follow-up history of AK's, SCC helical rim, excised with Dr. Obrien, right vertex ,sun damage.  Generally careful with sun protection wears sunscreen hat.  Patient presents with concerns above.  Family history melanoma    Patient has been in their usual state of health.     Past notes/ records and appropriate/relevant lab results including pathology and past body maps reviewed. Including outside notes/ PCP notes as appropriate. Updated and new information noted in current visit.     ROS:  Denies other relevant systemic complaints.       History, medications, allergies reviewed as noted.       Physical Examination:     Well-developed well-nourished patient alert oriented in no acute distress.  Exam performed, including scalp, head, neck, face,nails, hair, external eyes, including conjunctival mucosa, eyelids, lips external ears , arms, digits,palms.     Multiple light to medium brown, well marginated, uniformly pigmented, macules and papules 6 mm and less scattered on exam. pigmented lesions examined with dermoscopy benign-appearing patterns.     Waxy tannish keratotic papules scattered, cherry-red vascular papules scattered.    See map today's date for lesions noted .  See assessment and plan below for specific lesions.    Otherwise remarkable for lesions as noted on map.    See A/P  below for additional information:    Assessment / plan:    No orders of the defined types were placed in this encounter.      Meds & Refills for this Visit:  Requested Prescriptions      No prescriptions requested or ordered in this encounter         Encounter Diagnoses   Name Primary?    Actinic keratosis Yes    Solar lentigo     Multiple melanocytic nevi     Benign neoplasm of skin, unspecified location     Encounter for follow-up surveillance of skin cancer     Seborrheic keratoses     Sun-damaged skin        Waxy tan papule mid right lower lid observe seborrheic keratosis follow-up with oculoplastics if this becomes more bothersome monitor presently    Background of more irritation especially over the lower lid mild blepharitis over-the-counter scrubs or baby shampoo to cleanse area regularly.  Follow-up with ophthalmology, consider additional topicals if worsening.      Erythematous scaling keratotic papules noted at sites noted on map  Actinic Keratoses.  Precancerous nature discussed. Sun protection, sunscreen/ blocks encouraged Lesions treated with cryo- .  Biopsy if not resolved.    Diffusely over the scalp forehead temples right cheek nasal dorsum  helical rim left X20  continue regular follow-up sun protection    Lesion at left lateral orbit zygomatic area Pearly erythematous keratotic papule 5 mm.  Overall stable continue careful monitoring recommend biopsy patient declines at this visit.  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  Continue sunscreen.    Scattered nevi keratoses few papular lesions over the back, very few nevi over the lower extremities.      Pigmented lesions without atypical features on dermoscopy    No other susupicious lesions on todays  exam.      Continue regular follow-up sunscreen and sun protection.  Please refer to map for specific lesions.  See additional diagnoses.  Pros cons of various therapies, risks benefits discussed.Pathophysiology discussed with patient.  Therapeutic options reviewed.  See  Medications in grid.  Instructions reviewed at length.    Benign nevi, seborrheic  keratoses, cherry angiomas:  General skin care questions answered.   Reassurance regarding benign skin lesions.    Monitor for new or changing lesions. Signs and symptoms of skin cancer, ABCDE's of melanoma ( additional information available at AAD.org, skincancer.org) Encourage Sunscreen (broad-spectrum, ideally mineral-based-UVA/UVB -SPF 30 or higher) use encouraged, sun protection/sun protective clothing, self exams reviewed Followup as noted RTC ---routine checkup 6 mos -one year or p.r.n.    Encounter Times   Including precharting, reviewing chart, prior notes obtaining history: 10 minutes, medical exam :10 minutes, notes on body map, plan, counseling 10minutes My total time spent caring for the patient on the day of the encounter: 30 minutes     The patient indicates understanding of these issues and agrees to the  plan.  The patient is asked to return as noted in follow-up/ above.    This note was generated using Dragon voice recognition software.  Please contact me regarding any confusion resulting from errors in recognition..  Note to patient and family: The 21st Century Cures Act makes medical notes like these available to patients. However, be advised this is a medical document. It is intended as [id number] communication and monitoring of a patient's care needs. It is written in medical language and may contain abbreviations or verbiage that are unfamiliar. It may appear blunt or direct. Medical documents are intended to carry relevant information, facts as evident and the clinical opinion of the practitioner.

## 2025-02-11 NOTE — PROGRESS NOTES
HPI:     Chief Complaint     Actinic Keratosis        HPI     Actinic Keratosis      Additional comments: LOV 10/29/19. pt presenting today with AK f/u. (Scheduled for full body, refusing full body today). No new concerns. Previusly treated with cryo. Hyperlipemia    • Hypertelorism    • Unspecified essential hypertension      Past Surgical History:   Procedure Laterality Date   • KNEE ARTHROSCOPY       Social History    Socioeconomic History      Marital status:       Spouse name: Not on file Other (Other) Mother    • Melanoma Other         malignant-unk relative    • Heart Disease Brother         brother has hx of CABG       PHYSICAL EXAM:   An upper body exam was performed, including face, neck, chest, back, abdomen, r upper extremity, l uppe defined types were placed in this encounter.         6/16/2020  Williams Mor[place]aya No respiratory distress. No stridor, Lungs sounds clear with good aeration bilaterally.

## 2025-05-16 ENCOUNTER — OFFICE VISIT (OUTPATIENT)
Dept: DERMATOLOGY CLINIC | Facility: CLINIC | Age: 80
End: 2025-05-16
Payer: COMMERCIAL

## 2025-05-16 DIAGNOSIS — L57.0 ACTINIC KERATOSIS: Primary | ICD-10-CM

## 2025-05-16 DIAGNOSIS — D23.9 BENIGN NEOPLASM OF SKIN, UNSPECIFIED LOCATION: ICD-10-CM

## 2025-05-16 DIAGNOSIS — Z85.828 ENCOUNTER FOR FOLLOW-UP SURVEILLANCE OF SKIN CANCER: ICD-10-CM

## 2025-05-16 DIAGNOSIS — Z08 ENCOUNTER FOR FOLLOW-UP SURVEILLANCE OF SKIN CANCER: ICD-10-CM

## 2025-05-16 DIAGNOSIS — L57.8 SUN-DAMAGED SKIN: ICD-10-CM

## 2025-05-16 DIAGNOSIS — D22.9 MULTIPLE MELANOCYTIC NEVI: ICD-10-CM

## 2025-05-16 DIAGNOSIS — L82.1 SEBORRHEIC KERATOSES: ICD-10-CM

## 2025-05-16 DIAGNOSIS — L81.4 SOLAR LENTIGO: ICD-10-CM

## 2025-05-16 PROCEDURE — 1160F RVW MEDS BY RX/DR IN RCRD: CPT | Performed by: DERMATOLOGY

## 2025-05-16 PROCEDURE — 1159F MED LIST DOCD IN RCRD: CPT | Performed by: DERMATOLOGY

## 2025-05-16 PROCEDURE — 17003 DESTRUCT PREMALG LES 2-14: CPT | Performed by: DERMATOLOGY

## 2025-05-16 PROCEDURE — 99213 OFFICE O/P EST LOW 20 MIN: CPT | Performed by: DERMATOLOGY

## 2025-05-16 PROCEDURE — 17000 DESTRUCT PREMALG LESION: CPT | Performed by: DERMATOLOGY

## 2025-05-16 NOTE — PROGRESS NOTES
The following individual(s) verbally consented to be recorded using ambient AI listening technology and understand that they can each withdraw their consent to this listening technology at any point by asking the clinician to turn off or pause the recording:    Patient name: Mitchel Longo
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
## 2025-07-05 ENCOUNTER — HOSPITAL ENCOUNTER (OUTPATIENT)
Age: 80
Discharge: HOME | End: 2025-07-05
Payer: MEDICARE

## 2025-07-05 VITALS
RESPIRATION RATE: 20 BRPM | TEMPERATURE: 98 F | DIASTOLIC BLOOD PRESSURE: 86 MMHG | SYSTOLIC BLOOD PRESSURE: 156 MMHG | OXYGEN SATURATION: 97 % | HEART RATE: 80 BPM

## 2025-07-05 DIAGNOSIS — J32.9 SINOBRONCHITIS: Primary | ICD-10-CM

## 2025-07-05 DIAGNOSIS — J40 SINOBRONCHITIS: Primary | ICD-10-CM

## 2025-07-05 DIAGNOSIS — H10.33 ACUTE CONJUNCTIVITIS OF BOTH EYES, UNSPECIFIED ACUTE CONJUNCTIVITIS TYPE: ICD-10-CM

## 2025-07-05 RX ORDER — POLYMYXIN B SULFATE AND TRIMETHOPRIM 1; 10000 MG/ML; [USP'U]/ML
1 SOLUTION OPHTHALMIC EVERY 6 HOURS
Qty: 10 ML | Refills: 0 | Status: SHIPPED | OUTPATIENT
Start: 2025-07-05 | End: 2025-07-10

## 2025-07-05 RX ORDER — SACCHAROMYCES BOULARDII 250 MG
250 CAPSULE ORAL 2 TIMES DAILY
Qty: 20 CAPSULE | Refills: 0 | Status: SHIPPED | OUTPATIENT
Start: 2025-07-05 | End: 2025-07-15

## 2025-07-05 RX ORDER — FLUTICASONE PROPIONATE 50 MCG
1 SPRAY, SUSPENSION (ML) NASAL 2 TIMES DAILY
Qty: 16 G | Refills: 0 | Status: SHIPPED | OUTPATIENT
Start: 2025-07-05 | End: 2025-07-15

## 2025-07-05 RX ORDER — BENZONATATE 200 MG/1
200 CAPSULE ORAL 3 TIMES DAILY PRN
Qty: 20 CAPSULE | Refills: 0 | Status: SHIPPED | OUTPATIENT
Start: 2025-07-05 | End: 2025-07-12

## 2025-07-05 RX ORDER — DOXYCYCLINE 100 MG/1
100 CAPSULE ORAL 2 TIMES DAILY
Qty: 14 CAPSULE | Refills: 0 | Status: SHIPPED | OUTPATIENT
Start: 2025-07-05 | End: 2025-07-12

## 2025-07-05 NOTE — ED PROVIDER NOTES
Chief Complaint   Patient presents with    Cough     5 days. Cold. Cough.  Eye infection. Very tired . Wife had similar symptoms and is already on antibiotic and steroid and antibiotic eye drops - Entered by patient    Fatigue       HPI:     Mitchel Longo is a 79 year old male who presents for evaluation of sinus congestion and possible drainage over the last 5 days, notes developing eye discharge the last 2 days without history of eye issues including, cataracts or previous contact lens use.  Notes increasing fatigue over the last few days taking crack Tylenol Robitussin without documented fever, sick contacts include wife currently being treated for conjunctivitis with topical antibiotics oral doxycycline and prednisone improving with longer course of symptoms over the last 14 days.  Denies associated dizziness earache dysphagia blurred vision photophobia neck pain chest pain shortness of breath productive cough abdominal pain vomiting diarrhea dysuria or rash, denies history of asthma bronchitis or previous pneumonia.  Non-smoker history.  Notes tolerating p.o. well ambulating without issue, denies underlying cardiopulmonary history to date.  Notes sugar stable subjectively at home with meter type II diabetic.  Denies history of arrhythmia including atrial fibrillation.      PFSH    PFS asessment screens reviewed and agree.  Nurses notes reviewed I agree with documentation.    Family History[1]  Family history reviewed with patient/caregiver and is not pertinent to presenting problem.  Social History     Socioeconomic History    Marital status:      Spouse name: Not on file    Number of children: Not on file    Years of education: Not on file    Highest education level: Not on file   Occupational History    Not on file   Tobacco Use    Smoking status: Never    Smokeless tobacco: Never   Substance and Sexual Activity    Alcohol use: Not Currently     Comment: on occasion    Drug use: No    Sexual  activity: Not on file   Other Topics Concern    Grew up on a farm Not Asked    History of tanning Not Asked    Outdoor occupation Not Asked    Pt has a pacemaker No    Pt has a defibrillator No    Reaction to local anesthetic No   Social History Narrative    Not on file     Social Drivers of Health     Food Insecurity: No Food Insecurity (3/19/2025)    Received from [facility] [place]    Hunger Vital Sign     Worried About Running Out of Food in the Last Year: Never true     Ran Out of Food in the Last Year: Never true   Transportation Needs: No Transportation Needs (3/19/2025)    Received from [facility] [place]    PRAPARE - Transportation     Lack of Transportation (Medical): No     Lack of Transportation (Non-Medical): No   Housing Stability: Low Risk  (3/19/2025)    Received from [facility] [place]    Housing Stability Vital Sign     Unable to Pay for Housing in the Last Year: No     Number of Times Moved in the Last Year: 1     Homeless in the Last Year: No         ROS:   Positive for stated complaint: Sinus congestion cough body aches fatigue.  All other systems reviewed and negative except as noted above.  Constitutional and Vital Signs Reviewed.      Physical Exam:     Findings:    /86   Pulse 80   Temp 97.9 °F (36.6 °C) (Oral)   Resp 20   SpO2 97%   GENERAL: well developed, well nourished, well hydrated, no distress  SKIN: good skin turgor, no obvious rashes  NECK: No JVD.  Supple, no adenopathy  CARDIO: RRR without murmur  EXTREMITIES: no cyanosis or edema. LOO without difficulty  GI: soft, non-tender, normal bowel sounds  HEAD: normocephalic, atraumatic  EYES: sclera non icteric bilateral, conjunctiva clear  EARS: TMs clear bilaterally. Canals clear.  NOSE: Right sinus tenderness, mild rhinorrhea MMM.  Nasal turbinates: pink, normal mucosa  THROAT: clear, without exudates, uvula midline, and airway patent  LUNGS: No retractions.  Clear to auscultation  bilaterally; no rales, rhonchi, or wheezes  NEURO: No focal deficits  PSYCH: Alert and oriented x3.  Answering questions appropriately.  Mood appropriate.    MDM/Assessment/Plan:   Orders for this encounter:    Orders Placed This Encounter    benzonatate 200 MG Oral Cap     Sig: Take 1 capsule (200 mg total) by mouth 3 (three) times daily as needed for cough.     Dispense:  20 capsule     Refill:  0    doxycycline 100 MG Oral Cap     Sig: Take 1 capsule (100 mg total) by mouth 2 (two) times daily for 7 days.     Dispense:  14 capsule     Refill:  0    fluticasone propionate 50 MCG/ACT Nasal Suspension     Si spray by Nasal route in the morning and 1 spray before bedtime. Do all this for 10 days.     Dispense:  16 g     Refill:  0    saccharomyces boulardii (FLORASTOR) 250 MG Oral Cap     Sig: Take 1 capsule (250 mg total) by mouth 2 (two) times daily for 10 days.     Dispense:  20 capsule     Refill:  0    polymyxin B-trimethoprim 27473-7.1 UNIT/ML-% Ophthalmic Solution     Sig: Place 1 drop into both eyes every 6 (six) hours for 5 days.     Dispense:  10 mL     Refill:  0       Labs performed this visit:  No results found for this or any previous visit (from the past 10 hours).    MDM:  Pulse regular by evaluation radially.  Discussed with patient and family empiric antibiotic considerations for potential bacterial issue with understanding likely viral etiology with differential including adenovirus, agrees with no systemic corticosteroids based on diabetic history and hyperglycemic effect you provided fluticasone as an alternative postural support with steroid equivalent.  Patient agrees to readdress outpatient discussed radiographs with the patient and family agreeable based on impression and history to avoid at this time yet readdress for worsening or lingering issues, happy with plan of care ; alert nontoxic.    Diagnosis:    ICD-10-CM    1. Sinobronchitis  J32.9     J40       2. Acute conjunctivitis of  both eyes, unspecified acute conjunctivitis type  H10.33           All results reviewed and discussed with patient.  See AVS for detailed discharge instructions for your condition today.    Follow Up with:  Keron Eubanks MD  [facility] [address] [phone]    Schedule an appointment as soon as possible for a visit in 1 week  As needed, If symptoms worsen         [1]   Family History  Problem Relation Age of Onset    Cancer Father     Heart Disorder Mother     Hypertension Mother     Stroke Mother     Other (Other) Mother     Melanoma Other         malignant-unk relative     Heart Disease Brother         brother has hx of CABG

## (undated) NOTE — LETTER
No referring provider defined for this encounter. 09/16/22        Patient: Holden Meza   YOB: 1945   Date of Visit: 9/16/2022       Dear  Dr. Nathan Munoz MD,      Thank you for referring Holden Meza to my practice. Please find my assessment and plan below. ASSESSMENT AND PLAN    1. 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 I did discuss excising this lesion and reconstruction with possible full-thickness skin graft versus adjacent tissue transfer reconstruction. He does understand the risk of surgery to include but not limited to postoperative pain, bleeding as well as poor cosmesis. He accepts these risks and wishes to proceed               Sincerely,   Tato Thomas MD   [address] [place] [id number]    Document electronically generated by:  Mouna Thomas MD

## (undated) NOTE — MR AVS SNAPSHOT
[facility] SPECIAL[facility] hospitals - [place] [address] [address] [id number]  [phone]               Thank you for choosing us for your health care visit with Idris Morales MD.  We are glad to serve you and happy to provide you with this summary of y medications prescribed for you. Read the directions carefully, and ask your doctor or other care provider to review them with you. Follow-up Instructions     Return in about 6 months (around 7/3/2017).          Referral Information     Referral O